# Patient Record
Sex: FEMALE | Race: WHITE | NOT HISPANIC OR LATINO | Employment: UNEMPLOYED | URBAN - METROPOLITAN AREA
[De-identification: names, ages, dates, MRNs, and addresses within clinical notes are randomized per-mention and may not be internally consistent; named-entity substitution may affect disease eponyms.]

---

## 2013-02-13 LAB — PAP-ABSTRACT: NORMAL

## 2014-02-26 LAB
C TRACH DNA SPEC QL PROBE+SIG AMP: NEGATIVE
N GONORRHOEA DNA SPEC QL PROBE+SIG AMP: NEGATIVE
PAP-ABSTRACT: NORMAL
SPECIMEN DESCRIPTION: NORMAL

## 2017-01-03 ENCOUNTER — OFFICE VISIT (OUTPATIENT)
Dept: OBGYN | Facility: CLINIC | Age: 62
End: 2017-01-03
Payer: COMMERCIAL

## 2017-01-03 ENCOUNTER — RADIANT APPOINTMENT (OUTPATIENT)
Dept: MAMMOGRAPHY | Facility: CLINIC | Age: 62
End: 2017-01-03
Payer: COMMERCIAL

## 2017-01-03 VITALS
HEIGHT: 63 IN | DIASTOLIC BLOOD PRESSURE: 84 MMHG | BODY MASS INDEX: 28.7 KG/M2 | WEIGHT: 162 LBS | SYSTOLIC BLOOD PRESSURE: 156 MMHG

## 2017-01-03 DIAGNOSIS — R30.0 DYSURIA: ICD-10-CM

## 2017-01-03 DIAGNOSIS — Z11.59 NEED FOR HEPATITIS C SCREENING TEST: ICD-10-CM

## 2017-01-03 DIAGNOSIS — Z01.419 ENCOUNTER FOR GYNECOLOGICAL EXAMINATION WITHOUT ABNORMAL FINDING: Primary | ICD-10-CM

## 2017-01-03 DIAGNOSIS — Z12.31 VISIT FOR SCREENING MAMMOGRAM: ICD-10-CM

## 2017-01-03 PROBLEM — C34.32 MALIGNANT NEOPLASM OF LOWER LOBE OF LEFT LUNG (H): Status: ACTIVE | Noted: 2017-01-03

## 2017-01-03 LAB
ALBUMIN UR-MCNC: NEGATIVE MG/DL
APPEARANCE UR: CLEAR
BILIRUB UR QL STRIP: NEGATIVE
COLOR UR AUTO: YELLOW
GLUCOSE UR STRIP-MCNC: NEGATIVE MG/DL
HGB UR QL STRIP: NEGATIVE
KETONES UR STRIP-MCNC: NEGATIVE MG/DL
LEUKOCYTE ESTERASE UR QL STRIP: NEGATIVE
NITRATE UR QL: NEGATIVE
NON-SQ EPI CELLS #/AREA URNS LPF: ABNORMAL /LPF
PH UR STRIP: 7.5 PH (ref 5–7)
RBC #/AREA URNS AUTO: ABNORMAL /HPF (ref 0–2)
SP GR UR STRIP: 1.01 (ref 1–1.03)
URN SPEC COLLECT METH UR: ABNORMAL
UROBILINOGEN UR STRIP-ACNC: 0.2 EU/DL (ref 0.2–1)
WBC #/AREA URNS AUTO: ABNORMAL /HPF (ref 0–2)

## 2017-01-03 PROCEDURE — G0202 SCR MAMMO BI INCL CAD: HCPCS | Mod: TC

## 2017-01-03 PROCEDURE — 36415 COLL VENOUS BLD VENIPUNCTURE: CPT | Performed by: OBSTETRICS & GYNECOLOGY

## 2017-01-03 PROCEDURE — G0145 SCR C/V CYTO,THINLAYER,RESCR: HCPCS | Mod: 90 | Performed by: OBSTETRICS & GYNECOLOGY

## 2017-01-03 PROCEDURE — 99000 SPECIMEN HANDLING OFFICE-LAB: CPT | Performed by: OBSTETRICS & GYNECOLOGY

## 2017-01-03 PROCEDURE — 86803 HEPATITIS C AB TEST: CPT | Mod: 90 | Performed by: OBSTETRICS & GYNECOLOGY

## 2017-01-03 PROCEDURE — 81001 URINALYSIS AUTO W/SCOPE: CPT | Performed by: OBSTETRICS & GYNECOLOGY

## 2017-01-03 PROCEDURE — 99396 PREV VISIT EST AGE 40-64: CPT | Performed by: OBSTETRICS & GYNECOLOGY

## 2017-01-03 RX ORDER — CLONAZEPAM 1 MG/1
1 TABLET ORAL
COMMUNITY
Start: 2016-12-07 | End: 2018-03-12

## 2017-01-03 RX ORDER — GABAPENTIN 300 MG/1
300 CAPSULE ORAL
COMMUNITY
Start: 2016-07-20 | End: 2018-03-12

## 2017-01-03 RX ORDER — TRIAMTERENE/HYDROCHLOROTHIAZID 37.5-25 MG
0.5 TABLET ORAL
COMMUNITY
Start: 2016-06-14

## 2017-01-03 RX ORDER — ALBUTEROL SULFATE 90 UG/1
2 AEROSOL, METERED RESPIRATORY (INHALATION)
COMMUNITY
Start: 2016-10-17

## 2017-01-03 RX ORDER — AMLODIPINE BESYLATE 5 MG/1
5 TABLET ORAL
COMMUNITY
Start: 2016-12-22 | End: 2017-01-03

## 2017-01-03 RX ORDER — ALBUTEROL SULFATE 0.83 MG/ML
2.5 SOLUTION RESPIRATORY (INHALATION)
COMMUNITY
Start: 2016-06-14

## 2017-01-03 RX ORDER — AMLODIPINE BESYLATE 5 MG/1
TABLET ORAL
Refills: 0 | COMMUNITY
Start: 2016-12-22 | End: 2017-01-03

## 2017-01-03 RX ORDER — TIOTROPIUM BROMIDE 18 UG/1
CAPSULE ORAL; RESPIRATORY (INHALATION)
COMMUNITY
Start: 2016-08-19 | End: 2019-07-01

## 2017-01-03 RX ORDER — HYDROCODONE BITARTRATE AND ACETAMINOPHEN 10; 325 MG/1; MG/1
1 TABLET ORAL EVERY 6 HOURS PRN
COMMUNITY

## 2017-01-03 RX ORDER — GLUCOSAMINE SULFATE DIPOT CHLR 1000 MG
TABLET ORAL
COMMUNITY
Start: 2016-11-17 | End: 2017-01-03

## 2017-01-03 RX ORDER — ATENOLOL 25 MG/1
12.5 TABLET ORAL
COMMUNITY
Start: 2016-10-12

## 2017-01-03 RX ORDER — PRAVASTATIN SODIUM 40 MG
TABLET ORAL
COMMUNITY
Start: 2016-09-06

## 2017-01-03 ASSESSMENT — ANXIETY QUESTIONNAIRES
1. FEELING NERVOUS, ANXIOUS, OR ON EDGE: SEVERAL DAYS
5. BEING SO RESTLESS THAT IT IS HARD TO SIT STILL: NOT AT ALL
6. BECOMING EASILY ANNOYED OR IRRITABLE: NEARLY EVERY DAY
3. WORRYING TOO MUCH ABOUT DIFFERENT THINGS: SEVERAL DAYS
7. FEELING AFRAID AS IF SOMETHING AWFUL MIGHT HAPPEN: SEVERAL DAYS
2. NOT BEING ABLE TO STOP OR CONTROL WORRYING: SEVERAL DAYS
IF YOU CHECKED OFF ANY PROBLEMS ON THIS QUESTIONNAIRE, HOW DIFFICULT HAVE THESE PROBLEMS MADE IT FOR YOU TO DO YOUR WORK, TAKE CARE OF THINGS AT HOME, OR GET ALONG WITH OTHER PEOPLE: SOMEWHAT DIFFICULT
GAD7 TOTAL SCORE: 7

## 2017-01-03 ASSESSMENT — PATIENT HEALTH QUESTIONNAIRE - PHQ9: 5. POOR APPETITE OR OVEREATING: NOT AT ALL

## 2017-01-03 NOTE — Clinical Note
Please abstract the following data from this visit with this patient into the appropriate field in Epic:  Colonoscopy done on this date: 2013 (approximately), by this group:  [facility], results were Normal.

## 2017-01-03 NOTE — Clinical Note
[facility] [place]  [address]  [address]  [place] [address]  [phone]      1/4/2017     Cheryle C Warnberg  [address]  [place] [address]        Cheryle C Warnberg your lab results came back normal.        Results for orders placed or performed in visit on 01/03/17   Hepatitis C antibody   Result Value Ref Range    Hepatitis C Antibody  NR     Nonreactive   Assay performance characteristics have not been established for newborns,   infants, and children         Your hepatitis C test was negative. Have a great year!    Cordially    GETACHEW Saavedra CNP  [facility] [place]

## 2017-01-03 NOTE — PROGRESS NOTES
Cheryle is a 61 year old No obstetric history on file. female who presents for annual exam.     Besides routine health maintenance, she has no other health concerns today .    HPI: The patient was seen for annual examination. She has had a very challenging year as she was found to have a lung cancer in the lower left lobe of the lung. She had a laparoscopic partial resection of lung. She did not need any radiation therapy or chemotherapy. She has a 2 day history of some sharp low back pain. She is not having dysuria. Urinalysis will be ordered.  The patient's PCP is  Sanjay Grissom MD      GYNECOLOGIC HISTORY:    No LMP recorded. Patient has had a hysterectomy.  Her current contraception method is: hysterectomy.  She is no longer smoking due to lung cancer and surgery    Patient is sexually active.  STD testing offered?  Declined  Last PHQ-9 score on record =   PHQ-9 SCORE 1/3/2017   Total Score 9     Last GAD7 score on record =   AMANDA-7 SCORE 1/3/2017   Total Score 7     Alcohol Score = 0    HEALTH MAINTENANCE:  Cholesterol: Followed by PCP  Last Mammo: 6/2015, Result: normal, Next Mammo: today  Pap: (PAP      NIL   6/30/2015 )  Colonoscopy:  2013, Result: normal, Next Colonoscopy: 2023  Dexa:  6/30/2015    Health maintenance updated:  yes    HISTORY:  Obstetric History     No data available          Patient Active Problem List   Diagnosis     Essential hypertension, benign     Past Surgical History   Procedure Laterality Date     Hysterectomy  1999     C lat lumbar spine fusion        Social History   Substance Use Topics     Smoking status: Current Every Day Smoker     Smokeless tobacco: Never Used     Alcohol Use: No      Problem (# of Occurrences) Relation (Name,Age of Onset)    Hypertension (1) Mother            Current Outpatient Prescriptions   Medication Sig     atenolol (TENORMIN) 25 MG tablet Take 12.5 mg by mouth     clonazePAM (KLONOPIN) 1 MG tablet Take 1 mg by mouth     albuterol (2.5 MG/3ML) 0.083% neb  "solution Inhale 2.5 mg into the lungs     gabapentin (NEURONTIN) 300 MG capsule Take 300 mg by mouth     pravastatin (PRAVACHOL) 40 MG tablet      beclomethasone (QVAR) 80 MCG/ACT Inhaler      tiotropium (SPIRIVA HANDIHALER) 18 MCG capsule      tiZANidine (ZANAFLEX) 4 MG tablet      triamterene-hydrochlorothiazide (MAXZIDE-25) 37.5-25 MG per tablet Take 0.5 tablets by mouth     albuterol (VENTOLIN HFA) 108 (90 BASE) MCG/ACT Inhaler Inhale 2 puffs into the lungs     HYDROcodone-acetaminophen (NORCO)  MG per tablet Take 1 tablet by mouth every 6 hours as needed for moderate to severe pain     estradiol (ESTRACE) 1 MG tablet Take one half tablet by mouth once daily     acyclovir (ZOVIRAX) 400 MG tablet Take 1 tablet (400 mg) by mouth daily     No current facility-administered medications for this visit.     Allergies   Allergen Reactions     Aspirin Nausea     \"hot ears\"     Atorvastatin      Other reaction(s): Myalgia     Codeine Nausea and Vomiting     Ipratropium-Albuterol Cough     Morphine      Other reaction(s): Vomiting     Oxycodone-Acetaminophen Itching       Past medical, surgical, social and family histories were reviewed and updated in EPIC.    ROS:   12 point review of systems negative other than symptoms noted below.  Gastrointestinal: Constipation  Skin: Skin Dryness  Neurologic: Tremors    EXAM:  /110 mmHg  Ht 5' 2.75\" (1.594 m)  Wt 162 lb (73.483 kg)  BMI 28.92 kg/m2  Breastfeeding? No   BMI: Body mass index is 28.92 kg/(m^2).    PHYSICAL EXAM:  Constitutional:  Appearance: Well nourished, well developed, alert, in no acute distress  Neck:  Lymph Nodes:  No lymphadenopathy present    Thyroid:  Gland size normal, nontender, no nodules or masses present  on palpation  Chest:  Respiratory Effort:  Breathing unlabored  Cardiovascular:    Heart: Auscultation:  Regular rate, normal rhythm, no murmurs present  Breasts: Inspection of Breasts:  No lymphadenopathy present    Palpation of Breasts " and Axillae:  No masses present on palpation, no  breast tenderness    Axillary Lymph Nodes:  No lymphadenopathy present  Gastrointestinal:   Abdominal Examination:  Abdomen nontender to palpation, tone normal without rigidity or guarding, no masses present, umbilicus without lesions   Liver and Spleen:  No hepatomegaly present, liver nontender to palpation    Hernias:  No hernias present  Lymphatic: Lymph Nodes:  No other lymphadenopathy present  Skin:  General Inspection:  No rashes present, no lesions present, no areas of  discoloration    Genitalia and Groin:  No rashes present, no lesions present, no areas of  discoloration, no masses present  Neurologic/Psychiatric:    Mental Status:  Oriented X3     Pelvic Exam:  External Genitalia:     Normal appearance for age, no discharge present, no tenderness present, no inflammatory lesions present, color normal  Vagina:     Normal vaginal vault without central or paravaginal defects, no discharge present, no inflammatory lesions present, no masses present  Bladder:     Nontender to palpation  Urethra:   Urethral Body:  Urethra palpation normal, urethra structural support normal   Urethral Meatus:  No erythema or lesions present  Cervix:     Surgically absent  Uterus:     Surgically absent  Adnexa:     Surgically absent  Perineum:     Perineum within normal limits, no evidence of trauma, no rashes or skin lesions present  Anus:     Anus within normal limits, no hemorrhoids present  Inguinal Lymph Nodes:     No lymphadenopathy present    COUNSELING:   Reviewed preventive health counseling, as reflected in patient instructions       Regular exercise       Healthy diet/nutrition    BMI: Body mass index is 28.92 kg/(m^2).  Weight management plan: Discussed healthy diet and exercise guidelines and patient will follow up in 6 months in clinic to re-evaluate.    ASSESSMENT:  61 year old female with satisfactory annual exam.    ICD-10-CM    1. Encounter for gynecological  examination without abnormal finding [Z01.419] Z01.419 Pap imaged thin layer screen reflex to HPV if ASCUS - recommended age 25 - 29 years   2. Need for hepatitis C screening test Z11.59 Hepatitis C antibody   3. Lower back pain M54.5 UA with Microscopic       PLAN:  Patient has an adequate examination. She has good support from her previous vaginal surgery. Bladder support is excellent. Her back pain is really midline and low lumbar. We will do a urinalysis and get her mammogram today. Both results of her Pap smear and mammogram will be relayed to her. She does have some mild vulvitis that appears to be irritated from pads. We have asked her to use some a and D ointment.    Ricky Tom MD

## 2017-01-03 NOTE — Clinical Note
[facility]  [address] [address]  [place] [address]  [phone]      January 5, 2017    Cheryle C Warnberg  [address] [place] [id number]    Dear Cheryle,  We are happy to inform you that your PAP smear result is normal.  We are now able to do a follow up test on PAP smears. The DNA test is for HPV (Human Papilloma Virus). Cervical cancer is closely linked with certain types of HPV. Your result showed no evidence of HPV.  Therefore we recommend you return in 1 year for your next pap smear.  You will still need to return to the clinic every year for an annual exam and other preventive tests.  Please contact the clinic with any questions.  Sincerely,  Ricky Tom MD

## 2017-01-04 LAB — HCV AB SERPL QL IA: NORMAL

## 2017-01-04 ASSESSMENT — PATIENT HEALTH QUESTIONNAIRE - PHQ9: SUM OF ALL RESPONSES TO PHQ QUESTIONS 1-9: 9

## 2017-01-04 ASSESSMENT — ANXIETY QUESTIONNAIRES: GAD7 TOTAL SCORE: 7

## 2017-01-05 LAB
COPATH REPORT: NORMAL
PAP: NORMAL

## 2017-01-19 DIAGNOSIS — B00.9 HSV (HERPES SIMPLEX VIRUS) INFECTION: ICD-10-CM

## 2017-01-19 DIAGNOSIS — N95.1 SYMPTOMATIC MENOPAUSAL OR FEMALE CLIMACTERIC STATES: Primary | ICD-10-CM

## 2017-01-19 RX ORDER — ACYCLOVIR 400 MG/1
400 TABLET ORAL DAILY
Qty: 90 TABLET | Refills: 3 | Status: SHIPPED | OUTPATIENT
Start: 2017-01-19 | End: 2017-12-09

## 2017-01-19 RX ORDER — ESTRADIOL 1 MG/1
TABLET ORAL
Qty: 45 TABLET | Refills: 3 | Status: SHIPPED | OUTPATIENT
Start: 2017-01-19 | End: 2018-01-28

## 2017-02-10 ENCOUNTER — TELEPHONE (OUTPATIENT)
Dept: OBGYN | Facility: CLINIC | Age: 62
End: 2017-02-10

## 2017-02-10 NOTE — TELEPHONE ENCOUNTER
Was seen a couple weeks ago with Dr. Tom, had a sore bottom and was told to use A&D ointment. Calling today as it has gotten much worse.  Please call.

## 2017-02-10 NOTE — TELEPHONE ENCOUNTER
1/3/17 Annual Pt has severe vulvitis and wanted to know if there is anything that she could use other than A and D. Reviewed with Dr. Tom. Pt needs to be seen. Pt informed to try warm soaks and A and D and be seen.   1/3/17 She does have some mild vulvitis that appears to be irritated from pads. We have asked her to use some a and D ointment.

## 2017-02-13 ENCOUNTER — OFFICE VISIT (OUTPATIENT)
Dept: OBGYN | Facility: CLINIC | Age: 62
End: 2017-02-13
Payer: COMMERCIAL

## 2017-02-13 VITALS
SYSTOLIC BLOOD PRESSURE: 130 MMHG | HEIGHT: 63 IN | WEIGHT: 166 LBS | BODY MASS INDEX: 29.41 KG/M2 | DIASTOLIC BLOOD PRESSURE: 76 MMHG

## 2017-02-13 DIAGNOSIS — N76.3 CHRONIC VULVITIS: Primary | ICD-10-CM

## 2017-02-13 PROCEDURE — 99213 OFFICE O/P EST LOW 20 MIN: CPT | Performed by: OBSTETRICS & GYNECOLOGY

## 2017-02-13 NOTE — MR AVS SNAPSHOT
"              After Visit Summary   2017    Cheryle C Warnberg    MRN: [id number]           Patient Information     Date Of Birth          1955        Visit Information        Provider Department      2017 1:15 PM Ricky Tom MD [facility]        Today's Diagnoses     Chronic vulvitis    -  1       Follow-ups after your visit        Who to contact     If you have questions or need follow up information about today's clinic visit or your schedule please contact [facility] directly at [phone].  Normal or non-critical lab and imaging results will be communicated to you by [employer], letter or phone within 4 business days after the clinic has received the results. If you do not hear from us within 7 days, please contact the clinic through [employer] or phone. If you have a critical or abnormal lab result, we will notify you by phone as soon as possible.  Submit refill requests through [employer] or call your pharmacy and they will forward the refill request to us. Please allow 3 business days for your refill to be completed.          Additional Information About Your Visit        MyChart Information     [employer] lets you send messages to your doctor, view your test results, renew your prescriptions, schedule appointments and more. To sign up, go to [email or web address] . Click on \"Log in\" on the left side of the screen, which will take you to the Welcome page. Then click on \"Sign up Now\" on the right side of the page.     You will be asked to enter the access code listed below, as well as some personal information. Please follow the directions to create your username and password.     Your access code is: [email or web address]  Expires: 4/3/2017  2:14 PM     Your access code will  in 90 days. If you need help or a new code, please call your [facility] clinic or [phone].        Care EveryWhere ID     This is your Care EveryWhere ID. This could be used by other " "organizations to access your [facility] medical records  [id number]        Your Vitals Were     Height BMI (Body Mass Index)                1.594 m (5' 2.75\") 29.64 kg/m2           Blood Pressure from Last 3 Encounters:   02/13/17 130/76   01/03/17 156/84   06/30/15 124/76    Weight from Last 3 Encounters:   02/13/17 75.3 kg (166 lb)   01/03/17 73.5 kg (162 lb)   06/30/15 78.5 kg (173 lb)              Today, you had the following     No orders found for display       Primary Care Provider Office Phone # Fax #    Sanjay Grissom [phone]       [facility] [place] [address] [place] [address]        Thank you!     Thank you for choosing [facility]  for your care. Our goal is always to provide you with excellent care. Hearing back from our patients is one way we can continue to improve our services. Please take a few minutes to complete the written survey that you may receive in the mail after your visit with us. Thank you!             Your Updated Medication List - Protect others around you: Learn how to safely use, store and throw away your medicines at www.disposemymeds.org.          This list is accurate as of: 2/13/17  1:39 PM.  Always use your most recent med list.                   Brand Name Dispense Instructions for use    acyclovir 400 MG tablet    ZOVIRAX    90 tablet    Take 1 tablet (400 mg) by mouth daily       * albuterol (2.5 MG/3ML) 0.083% neb solution      Inhale 2.5 mg into the lungs       * VENTOLIN  (90 BASE) MCG/ACT Inhaler   Generic drug:  albuterol      Inhale 2 puffs into the lungs       atenolol 25 MG tablet    TENORMIN     Take 12.5 mg by mouth       clonazePAM 1 MG tablet    klonoPIN     Take 1 mg by mouth       estradiol 1 MG tablet    ESTRACE    45 tablet    Take one half tablet by mouth once daily       gabapentin 300 MG capsule    NEURONTIN     Take 300 mg by mouth       HYDROcodone-acetaminophen  MG per tablet    NORCO     Take 1 " tablet by mouth every 6 hours as needed for moderate to severe pain       pravastatin 40 MG tablet    PRAVACHOL         QVAR 80 MCG/ACT Inhaler   Generic drug:  beclomethasone          SPIRIVA HANDIHALER 18 MCG capsule   Generic drug:  tiotropium          tiZANidine 4 MG tablet    ZANAFLEX         triamterene-hydrochlorothiazide 37.5-25 MG per tablet    MAXZIDE-25     Take 0.5 tablets by mouth       * Notice:  This list has 2 medication(s) that are the same as other medications prescribed for you. Read the directions carefully, and ask your doctor or other care provider to review them with you.

## 2017-02-13 NOTE — PROGRESS NOTES
SUBJECTIVE:                                                   Cheryle C Warnberg is a 62 year old female who presents to clinic today for the following health issue(s):  No chief complaint on file.      Additional information:     HPI: The patient is seen at this time for follow-up of vulvitis. She tried a and D Ointment but it was too irritating. She has been sitting in the tub and soaking and there is some improvement.    No LMP recorded. Patient has had a hysterectomy..   Patient is not sexually active, No obstetric history on file..  Using hysterectomy for contraception.    reports that she has been smoking.  She has never used smokeless tobacco.    STD testing offered?  Declined    Health maintenance updated:  yes    Today's PHQ-2 Score:   PHQ-2 ( 1999 [employer]) 6/30/2015   Q1: Little interest or pleasure in doing things 0   Q2: Feeling down, depressed or hopeless 0   PHQ-2 Score 0     Today's PHQ-9 Score:   PHQ-9 SCORE 1/3/2017   Total Score 9     Today's AMANDA-7 Score:   AMANDA-7 SCORE 1/3/2017   Total Score 7       Problem list and histories reviewed & adjusted, as indicated.  Additional history: as documented.    Patient Active Problem List   Diagnosis     Essential hypertension, benign     Malignant neoplasm of lower lobe of left lung (H)     Past Surgical History   Procedure Laterality Date     Hysterectomy  1999     C lat lumbar spine fusion        Social History   Substance Use Topics     Smoking status: Current Every Day Smoker     Smokeless tobacco: Never Used     Alcohol use No      Problem (# of Occurrences) Relation (Name,Age of Onset)    Hypertension (1) Mother            Current Outpatient Prescriptions   Medication Sig     estradiol (ESTRACE) 1 MG tablet Take one half tablet by mouth once daily     acyclovir (ZOVIRAX) 400 MG tablet Take 1 tablet (400 mg) by mouth daily     atenolol (TENORMIN) 25 MG tablet Take 12.5 mg by mouth     clonazePAM (KLONOPIN) 1 MG tablet Take 1 mg by mouth     albuterol  "(2.5 MG/3ML) 0.083% neb solution Inhale 2.5 mg into the lungs     gabapentin (NEURONTIN) 300 MG capsule Take 300 mg by mouth     pravastatin (PRAVACHOL) 40 MG tablet      beclomethasone (QVAR) 80 MCG/ACT Inhaler      tiotropium (SPIRIVA HANDIHALER) 18 MCG capsule      tiZANidine (ZANAFLEX) 4 MG tablet      triamterene-hydrochlorothiazide (MAXZIDE-25) 37.5-25 MG per tablet Take 0.5 tablets by mouth     albuterol (VENTOLIN HFA) 108 (90 BASE) MCG/ACT Inhaler Inhale 2 puffs into the lungs     HYDROcodone-acetaminophen (NORCO)  MG per tablet Take 1 tablet by mouth every 6 hours as needed for moderate to severe pain     No current facility-administered medications for this visit.      Allergies   Allergen Reactions     Aspirin Nausea     \"hot ears\"     Atorvastatin      Other reaction(s): Myalgia     Codeine Nausea and Vomiting     Ipratropium-Albuterol Cough     Morphine      Other reaction(s): Vomiting     Oxycodone-Acetaminophen Itching       ROS:  12 point review of systems negative other than symptoms noted below.  Cardiovascular: Palpitations  Gastrointestinal: Abdominal Pain, Constipation and Diarrhea  Genitourinary: Cramps, Painful Urination, Pelvic Pain, Vaginal Discharge, Vaginal Dryness and Vaginal Itching  Skin: Skin Dryness  Neurologic: Tremors  Musculoskeletal: Muscle Cramps  Endocrine: Excessive Thirst  Psychiatric: Ferguson    OBJECTIVE:     There were no vitals taken for this visit.  There is no height or weight on file to calculate BMI.    Exam:  Constitutional:  Appearance: Well nourished, well developed alert, in no acute distress   Pelvic Exam performed showed much improved labial and vulvar skin. There are no raised or excoriated lesions. There is still some atrophy.    In-Clinic Test Results:  No results found for this or any previous visit (from the past 24 hour(s)).    ASSESSMENT/PLAN:                                                      The patient is seen at this time for follow-up of " vulvitis. She is asked to try the zinc oxide variety of AMD as she did not tolerate the standard version. She will continue to soak in a tub. She also complained of some abdominal burning but she has no internal GYN organs and abdominal examination was unremarkable.          Ricky Tom MD  [facility]

## 2017-02-24 ENCOUNTER — TELEPHONE (OUTPATIENT)
Dept: OBGYN | Facility: CLINIC | Age: 62
End: 2017-02-24

## 2017-02-24 NOTE — TELEPHONE ENCOUNTER
"POC note 2/13/17: \" The patient is seen at this time for follow-up of vulvitis. She tried a and D Ointment but it was too irritating. She has been sitting in the tub and soaking and there is some improvement.The patient is seen at this time for follow-up of vulvitis. She is asked to try the zinc oxide variety of AMD as she did not tolerate the standard version. She will continue to soak in a tub. She also complained of some abdominal burning but she has no internal GYN organs and abdominal examination was unremarkable.\"  Informed pt of her mammogram results. Pt stated that a few days ago she noticed discharge from both of her nipples, she states it is dark in color almost black and contains pus. No odor, no redness, no warmth to the touch, no fevers.   Pt states her vulvitis is not getting better and she states after she was seen the last time her symptoms have gotten worse. Pt states frustration since she has been to the clinic twice and her symptoms have not improved.   Dr. Tom out of office- note routed to Maral Eid to review and advise.   "

## 2017-02-27 ENCOUNTER — OFFICE VISIT (OUTPATIENT)
Dept: OBGYN | Facility: CLINIC | Age: 62
End: 2017-02-27
Payer: COMMERCIAL

## 2017-02-27 ENCOUNTER — TELEPHONE (OUTPATIENT)
Dept: NURSING | Facility: CLINIC | Age: 62
End: 2017-02-27

## 2017-02-27 VITALS — SYSTOLIC BLOOD PRESSURE: 138 MMHG | DIASTOLIC BLOOD PRESSURE: 80 MMHG | BODY MASS INDEX: 29.64 KG/M2 | WEIGHT: 166 LBS

## 2017-02-27 DIAGNOSIS — N76.2 ACUTE VULVITIS: ICD-10-CM

## 2017-02-27 DIAGNOSIS — N64.52 NIPPLE DISCHARGE: Primary | ICD-10-CM

## 2017-02-27 PROCEDURE — 99213 OFFICE O/P EST LOW 20 MIN: CPT | Performed by: NURSE PRACTITIONER

## 2017-02-27 PROCEDURE — 88160 CYTOPATH SMEAR OTHER SOURCE: CPT | Performed by: NURSE PRACTITIONER

## 2017-02-27 RX ORDER — CLOBETASOL PROPIONATE 0.5 MG/G
CREAM TOPICAL DAILY
Qty: 15 G | Refills: 0 | Status: SHIPPED | OUTPATIENT
Start: 2017-02-27 | End: 2019-07-01

## 2017-02-27 NOTE — MR AVS SNAPSHOT
After Visit Summary   2/27/2017    Cheryle C Warnberg    MRN: [id number]           Patient Information     Date Of Birth          1955        Visit Information        Provider Department      2/27/2017 11:00 AM Michelle Eid APRN CNP [facility]        Today's Diagnoses     Nipple discharge    -  1    Acute vulvitis           Follow-ups after your visit        Follow-up notes from your care team     Return in about 1 week (around 3/6/2017) for janelle.      Your next 10 appointments already scheduled     Mar 06, 2017 11:00 AM CST   Office Visit with GETACHEW Le CNP   [facility] ([facility])    [address] [place] [id number] [phone]           Bring a current list of meds and any records pertaining to this visit.  For Physicals, please bring immunization records and any forms needing to be filled out.  Please arrive 10 minutes early to complete paperwork.              Future tests that were ordered for you today     Open Future Orders        Priority Expected Expires Ordered    US Breast Bilateral Complete 4 Quadrants Routine 2/27/2017 2/26/2018 2/27/2017            Who to contact     If you have questions or need follow up information about today's clinic visit or your schedule please contact [facility] directly at [phone].  Normal or non-critical lab and imaging results will be communicated to you by MyChart, letter or phone within 4 business days after the clinic has received the results. If you do not hear from us within 7 days, please contact the clinic through MyChart or phone. If you have a critical or abnormal lab result, we will notify you by phone as soon as possible.  Submit refill requests through Mimeo or call your pharmacy and they will forward the refill request to us. Please allow 3 business days for your refill to be completed.           Additional Information About Your Visit        MyChart Information     Next audience gives you secure access to your electronic health record. If you see a primary care provider, you can also send messages to your care team and make appointments. If you have questions, please call your primary care clinic.  If you do not have a primary care provider, please call [phone] and they will assist you.        Care EveryWhere ID     This is your Care EveryWhere ID. This could be used by other organizations to access your [place] medical records  [id number]        Your Vitals Were     Breastfeeding? BMI (Body Mass Index)                No 29.64 kg/m2           Blood Pressure from Last 3 Encounters:   02/27/17 138/80   02/13/17 130/76   01/03/17 156/84    Weight from Last 3 Encounters:   02/27/17 166 lb (75.3 kg)   02/13/17 166 lb (75.3 kg)   01/03/17 162 lb (73.5 kg)              We Performed the Following     Cytology non gyn          Today's Medication Changes          These changes are accurate as of: 2/27/17 11:46 AM.  If you have any questions, ask your nurse or doctor.               Start taking these medicines.        Dose/Directions    clobetasol 0.05 % cream   Commonly known as:  TEMOVATE   Used for:  Acute vulvitis   Started by:  Michelle Eid APRN CNP        Apply topically daily Apply sparingly to affected area  daily for 14 days.  Do not apply to face carie   Quantity:  15 g   Refills:  0            Where to get your medicines      These medications were sent to [facility] [address] [place] [address] [address] [facility]  [address] [place] [address]     Phone:  [phone]     clobetasol 0.05 % cream                Primary Care Provider Office Phone # Fax #    Sanjay Grissom [phone]       [facility] [address] [address]        Thank you!     Thank you for choosing [facility]  for your care. Our goal is  always to provide you with excellent care. Hearing back from our patients is one way we can continue to improve our services. Please take a few minutes to complete the written survey that you may receive in the mail after your visit with us. Thank you!             Your Updated Medication List - Protect others around you: Learn how to safely use, store and throw away your medicines at www.disposemymeds.org.          This list is accurate as of: 2/27/17 11:46 AM.  Always use your most recent med list.                   Brand Name Dispense Instructions for use    acyclovir 400 MG tablet    ZOVIRAX    90 tablet    Take 1 tablet (400 mg) by mouth daily       * albuterol (2.5 MG/3ML) 0.083% neb solution      Inhale 2.5 mg into the lungs       * VENTOLIN  (90 BASE) MCG/ACT Inhaler   Generic drug:  albuterol      Inhale 2 puffs into the lungs       atenolol 25 MG tablet    TENORMIN     Take 12.5 mg by mouth       clobetasol 0.05 % cream    TEMOVATE    15 g    Apply topically daily Apply sparingly to affected area  daily for 14 days.  Do not apply to face carie       clonazePAM 1 MG tablet    klonoPIN     Take 1 mg by mouth       estradiol 1 MG tablet    ESTRACE    45 tablet    Take one half tablet by mouth once daily       gabapentin 300 MG capsule    NEURONTIN     Take 300 mg by mouth       HYDROcodone-acetaminophen  MG per tablet    NORCO     Take 1 tablet by mouth every 6 hours as needed for moderate to severe pain       pravastatin 40 MG tablet    PRAVACHOL         QVAR 80 MCG/ACT Inhaler   Generic drug:  beclomethasone          SPIRIVA HANDIHALER 18 MCG capsule   Generic drug:  tiotropium          tiZANidine 4 MG tablet    ZANAFLEX         triamterene-hydrochlorothiazide 37.5-25 MG per tablet    MAXZIDE-25     Take 0.5 tablets by mouth       * Notice:  This list has 2 medication(s) that are the same as other medications prescribed for you. Read the directions carefully, and ask your doctor or other care  provider to review them with you.

## 2017-02-27 NOTE — PROGRESS NOTES
SUBJECTIVE:                                                   Cheryle C Warnberg is a 62 year old female who presents to clinic today for the following health issue(s):  Patient presents with:  Vaginal Problem: pain  Breast Discharge      HPI:here for recurrent vaginal irritation and pain .  Been doing tub soaks bid and  A&D ointment with Zinc.  Having to wear nightgown all day with no underwear to tolerate.  Also noted some brown discharge from both nipples since last week.  Denies any pain no lumps felt.  Hx of lung CA      No LMP recorded. Patient has had a hysterectomy..   Patient is not sexually active, No obstetric history on file..  Using hysterectomy for contraception.    reports that she has quit smoking. She has never used smokeless tobacco.    STD testing offered?  Declined    Health maintenance updated:  yes    Today's PHQ-2 Score:   PHQ-2 ( 1999 Pfizer) 6/30/2015   Q1: Little interest or pleasure in doing things 0   Q2: Feeling down, depressed or hopeless 0   PHQ-2 Score 0     Today's PHQ-9 Score:   PHQ-9 SCORE 1/3/2017   Total Score 9     Today's AMANDA-7 Score:   AMANDA-7 SCORE 1/3/2017   Total Score 7       Problem list and histories reviewed & adjusted, as indicated.  Additional history: as documented.    Patient Active Problem List   Diagnosis     Essential hypertension, benign     Malignant neoplasm of lower lobe of left lung (H)     Past Surgical History   Procedure Laterality Date     Hysterectomy  1999     C lat lumbar spine fusion        Social History   Substance Use Topics     Smoking status: Former Smoker     Smokeless tobacco: Never Used      Comment: stopped 4/11/16     Alcohol use No      Problem (# of Occurrences) Relation (Name,Age of Onset)    Hypertension (1) Mother            Current Outpatient Prescriptions   Medication Sig     clobetasol (TEMOVATE) 0.05 % cream Apply topically daily Apply sparingly to affected area  daily for 14 days.  Do not apply to face carie     estradiol  "(ESTRACE) 1 MG tablet Take one half tablet by mouth once daily     acyclovir (ZOVIRAX) 400 MG tablet Take 1 tablet (400 mg) by mouth daily     atenolol (TENORMIN) 25 MG tablet Take 12.5 mg by mouth     clonazePAM (KLONOPIN) 1 MG tablet Take 1 mg by mouth     albuterol (2.5 MG/3ML) 0.083% neb solution Inhale 2.5 mg into the lungs     gabapentin (NEURONTIN) 300 MG capsule Take 300 mg by mouth     pravastatin (PRAVACHOL) 40 MG tablet      beclomethasone (QVAR) 80 MCG/ACT Inhaler      tiotropium (SPIRIVA HANDIHALER) 18 MCG capsule      tiZANidine (ZANAFLEX) 4 MG tablet      triamterene-hydrochlorothiazide (MAXZIDE-25) 37.5-25 MG per tablet Take 0.5 tablets by mouth     albuterol (VENTOLIN HFA) 108 (90 BASE) MCG/ACT Inhaler Inhale 2 puffs into the lungs     HYDROcodone-acetaminophen (NORCO)  MG per tablet Take 1 tablet by mouth every 6 hours as needed for moderate to severe pain     No current facility-administered medications for this visit.      Allergies   Allergen Reactions     Aspirin Nausea     \"hot ears\"     Atorvastatin      Other reaction(s): Myalgia     Codeine Nausea and Vomiting     Ipratropium-Albuterol Cough     Morphine      Other reaction(s): Vomiting     Oxycodone-Acetaminophen Itching       ROS:  12 point review of systems negative other than symptoms noted below.  Breast: Nipple Discharge  Gastrointestinal: Abdominal Pain and Constipation  Genitourinary: Cramps, Painful Urination, Urgency, Vaginal Dryness and Vaginal Itching  Skin: New Skin Lesions    OBJECTIVE:     /80  Wt 166 lb (75.3 kg)  Breastfeeding? No  BMI 29.64 kg/m2  Body mass index is 29.64 kg/(m^2).    Exam:  Breasts no masses noted.  Bilateral brown nipple discharge upon squeezing.  Discharge placed on slide and sent to cytology.  Axilla negative.    External genitalia very red and atrophic patches on both labia.  Also patches noted in side vagina.  Tender to patient upon exam.  Rechecked by Dr. Tom, and possible lichen " sclerosis.  Patient to continue tub soaks bid. Will start temovate cream only once daily.    In-Clinic Test Results:  No results found for this or any previous visit (from the past 24 hour(s)).    ASSESSMENT/PLAN:                                                        ICD-10-CM    1. Nipple discharge N64.52 Cytology non gyn     US Breast Bilateral Complete 4 Quadrants   2. Acute vulvitis N76.2 clobetasol (TEMOVATE) 0.05 % cream       There are no Patient Instructions on file for this visit.    Will do breast US and appropriate follow up for nipple discharge.  Return in 1 week for janelle with DR. Tom for vaginal irritation.    GETACHEW Le [facility]

## 2017-02-27 NOTE — TELEPHONE ENCOUNTER
Michelle at [facility] call with a question regarding the sig on Clobetasol Cream. The sig states Apply topically daily Apply sparingly to affected area  daily for 14 days.  Do not apply to face carie. Unsure what do not apply for face carie means. Routing to Maral Eid. Please advise.

## 2017-03-01 LAB — COPATH REPORT: NORMAL

## 2017-03-02 ENCOUNTER — TELEPHONE (OUTPATIENT)
Dept: OBGYN | Facility: CLINIC | Age: 62
End: 2017-03-02

## 2017-03-02 NOTE — TELEPHONE ENCOUNTER
Pt has not heard from  Breast Center. Offered to give pt phone number to call and schedule. Pt wants us to call and have them call her. Pt also asking about lab testing Maral did on Monday. Will route to Maral Eid to review lab results and advise. Will call Breast center in AM and ask them to call pt.

## 2017-03-02 NOTE — TELEPHONE ENCOUNTER
Patient called because no one has called her in regards to her mammogram, if it was going to be in depth or and different type of ultra sound. She hasn't heard from anyone and is requesting you to call her back.

## 2017-03-03 NOTE — TELEPHONE ENCOUNTER
[facility] Call. Spoke with central scheduling and they have the referral and will call the pt to schedule.

## 2017-03-06 ENCOUNTER — TELEPHONE (OUTPATIENT)
Dept: NURSING | Facility: CLINIC | Age: 62
End: 2017-03-06

## 2017-03-06 NOTE — TELEPHONE ENCOUNTER
Reason for Call:  Other call back    Detailed comments: pt just returned Alma Delia's phone call from today    Phone Number Patient can be reached at: Home number on file [phone] (home)    Best Time: today or tomorrow am    Can we leave a detailed message on this number? YES    Call taken on 3/6/2017 at 3:48 PM by Aaliyah Subramanian

## 2017-03-06 NOTE — TELEPHONE ENCOUNTER
[facility] has locations in [facility] and at [facility] in [place], [place]. Dr. Tom indicated [place] would probably be the closest. Called [facility] (who are within [facility]) and they would be able to do a diagnostic mammogram and bilateral breast ultrasounds but their ultrasounds are booked out a wk and a half. Also since it's at a hospital the cost may be more expensive than at a clinic. Talked to Dr. Tom and he stated whichever one the pt wants. Once found out location will need call and get fax number information (as to where to send the order off to) and the phone number where pt can call to schedule the appt.      LM on VM (no PHI on consent) to call back.

## 2017-03-06 NOTE — TELEPHONE ENCOUNTER
Pt calling was supposed to have a bilateral breast ultrasound and diagnostic mammogram today at [facility] for (brown) nipple discharge of both bresats but [facility] is considered out of her network and would like an order for [facility] to have it done. Lives in [place]. Routing to Dr. Tom, ok to send orders to [facility]?      2/27/17 visit with MARTA Alicia:   Will do breast US and appropriate follow up for nipple discharge. Return in 1 week for janelle with DR. Tom for vaginal irritation.

## 2017-03-07 NOTE — TELEPHONE ENCOUNTER
Pt called back and informed her of the information below. Pt states she would like to go to [facility] location in [place]. States it's ok to LM on her VM ([phone]) with the phone number that she has to call ([phone]).  Order form placed on Dr. Tom's desk to sign and then return to Triage. Will then need to fax order to [facility] in [place]. Did also print out her screening mammogram results from her mammogram that she had done on 1/3/17 so they have a reference. They are in Epic as well and are also to see the images through Care Everywhere.

## 2017-03-08 NOTE — TELEPHONE ENCOUNTER
Bilateral Digital Diagnostic Mammogram and Breast [facility] CMPL 4 Quad Ultrasound order (with note pt having nipple drainage from both breasts) along with result from screening mammogram from 1/3/17 faxed to [facility] in [place] (fax number: [phone]). Pt informed order was faxed and gave her the phone number to call to schedule the appt (s). Pt verbalized understanding of information.      Closing encounter.

## 2017-03-13 ENCOUNTER — TRANSFERRED RECORDS (OUTPATIENT)
Dept: HEALTH INFORMATION MANAGEMENT | Facility: CLINIC | Age: 62
End: 2017-03-13

## 2017-03-21 ENCOUNTER — TELEPHONE (OUTPATIENT)
Dept: OBGYN | Facility: CLINIC | Age: 62
End: 2017-03-21

## 2017-03-21 NOTE — TELEPHONE ENCOUNTER
rec'd PA for clobetasol cream. Spoke with patient to encourage her to call insurance to see what is on formulary. Patient states that she since has gone to her PCP and was treated for a really bad UTI and feels that some of her symptoms have maybe gotten a little better. She also finds out since being seen that her insurance will no longer allow her to come here. Notified if she needs any records to let us know. Gave her phone numbers for the breast center and [facility] as she requested for mammograms. No PA being done at this time as patient does not plan on picking up this medication.

## 2017-12-09 DIAGNOSIS — B00.9 HSV (HERPES SIMPLEX VIRUS) INFECTION: ICD-10-CM

## 2017-12-11 RX ORDER — ACYCLOVIR 400 MG/1
TABLET ORAL
Qty: 30 TABLET | Refills: 0 | Status: SHIPPED | OUTPATIENT
Start: 2017-12-11 | End: 2018-02-23

## 2017-12-11 NOTE — TELEPHONE ENCOUNTER
acyclovir      Last Written Prescription Date:  1/19/17  Last Fill Quantity: 90,   # refills: 3  Last Office Visit: 1/3/17  Future Office visit:   none    Medication is being filled for 1 time refill only due to:  Patient needs to be seen because it has been more than one year since last visit.   Pt due for annual, no appt scheduled. One month extension sent per [facility] protocol.

## 2018-01-28 DIAGNOSIS — N95.1 SYMPTOMATIC MENOPAUSAL OR FEMALE CLIMACTERIC STATES: ICD-10-CM

## 2018-01-29 RX ORDER — ESTRADIOL 1 MG/1
TABLET ORAL
Qty: 15 TABLET | Refills: 0 | Status: SHIPPED | OUTPATIENT
Start: 2018-01-29 | End: 2019-07-01

## 2018-01-29 NOTE — TELEPHONE ENCOUNTER
estradiol (ESTRACE) 1 MG tablet   Last Written Prescription Date:  1/19/17  Last Fill Quantity: 45,   # refills: 3  Last Office Visit with G primary care provider:  1/3/17  Future Office visit: none    Routing refill request to provider for review/approval because:  Pt due or annual. No appointment scheduled. One month extension sent.

## 2018-02-20 ENCOUNTER — TELEPHONE (OUTPATIENT)
Dept: NURSING | Facility: CLINIC | Age: 63
End: 2018-02-20

## 2018-02-20 DIAGNOSIS — B00.9 HSV (HERPES SIMPLEX VIRUS) INFECTION: ICD-10-CM

## 2018-02-20 RX ORDER — ACYCLOVIR 400 MG/1
TABLET ORAL
Qty: 30 TABLET | Refills: 0 | OUTPATIENT
Start: 2018-02-20

## 2018-02-20 NOTE — TELEPHONE ENCOUNTER
valtrex      Last Written Prescription Date:  12/11/17  Last Fill Quantity: 30,   # refills: 0  Last Office Visit: 1/3/17  Future Office visit:   none    Pt due for annual, no appt scheduled. Pt already received one month extension. Rx denied.

## 2018-02-23 RX ORDER — ACYCLOVIR 400 MG/1
TABLET ORAL
Qty: 90 TABLET | Refills: 0 | Status: SHIPPED | OUTPATIENT
Start: 2018-02-23 | End: 2018-06-15

## 2018-02-23 NOTE — TELEPHONE ENCOUNTER
Has annual scheduled now.  Pt due for annual, appt scheduled,3 month supply sent for insurance purposes.

## 2018-03-12 ENCOUNTER — RADIANT APPOINTMENT (OUTPATIENT)
Dept: MAMMOGRAPHY | Facility: CLINIC | Age: 63
End: 2018-03-12
Payer: COMMERCIAL

## 2018-03-12 ENCOUNTER — OFFICE VISIT (OUTPATIENT)
Dept: OBGYN | Facility: CLINIC | Age: 63
End: 2018-03-12
Payer: COMMERCIAL

## 2018-03-12 VITALS
DIASTOLIC BLOOD PRESSURE: 68 MMHG | WEIGHT: 168 LBS | BODY MASS INDEX: 29.77 KG/M2 | SYSTOLIC BLOOD PRESSURE: 114 MMHG | HEART RATE: 68 BPM | HEIGHT: 63 IN

## 2018-03-12 DIAGNOSIS — N95.1 SYMPTOMATIC MENOPAUSAL OR FEMALE CLIMACTERIC STATES: ICD-10-CM

## 2018-03-12 DIAGNOSIS — N95.1 SYMPTOMS, SUCH AS FLUSHING, SLEEPLESSNESS, HEADACHE, LACK OF CONCENTRATION, ASSOCIATED WITH THE MENOPAUSE: ICD-10-CM

## 2018-03-12 DIAGNOSIS — R30.0 DYSURIA: Primary | ICD-10-CM

## 2018-03-12 DIAGNOSIS — Z12.4 SCREENING FOR CERVICAL CANCER: ICD-10-CM

## 2018-03-12 DIAGNOSIS — Z12.31 VISIT FOR SCREENING MAMMOGRAM: ICD-10-CM

## 2018-03-12 LAB
ALBUMIN UR-MCNC: NEGATIVE MG/DL
APPEARANCE UR: CLEAR
BILIRUB UR QL STRIP: NEGATIVE
COLOR UR AUTO: YELLOW
GLUCOSE UR STRIP-MCNC: NEGATIVE MG/DL
HGB UR QL STRIP: NEGATIVE
KETONES UR STRIP-MCNC: NEGATIVE MG/DL
LEUKOCYTE ESTERASE UR QL STRIP: NEGATIVE
NITRATE UR QL: NEGATIVE
PH UR STRIP: 8.5 PH (ref 5–7)
SOURCE: ABNORMAL
SP GR UR STRIP: 1.01 (ref 1–1.03)
UROBILINOGEN UR STRIP-ACNC: 0.2 EU/DL (ref 0.2–1)

## 2018-03-12 PROCEDURE — 81003 URINALYSIS AUTO W/O SCOPE: CPT | Performed by: OBSTETRICS & GYNECOLOGY

## 2018-03-12 PROCEDURE — 99213 OFFICE O/P EST LOW 20 MIN: CPT | Mod: 25 | Performed by: OBSTETRICS & GYNECOLOGY

## 2018-03-12 PROCEDURE — 99397 PER PM REEVAL EST PAT 65+ YR: CPT | Performed by: OBSTETRICS & GYNECOLOGY

## 2018-03-12 PROCEDURE — 77067 SCR MAMMO BI INCL CAD: CPT | Mod: TC

## 2018-03-12 PROCEDURE — G0476 HPV COMBO ASSAY CA SCREEN: HCPCS | Performed by: OBSTETRICS & GYNECOLOGY

## 2018-03-12 PROCEDURE — G0145 SCR C/V CYTO,THINLAYER,RESCR: HCPCS | Performed by: OBSTETRICS & GYNECOLOGY

## 2018-03-12 RX ORDER — ESTRADIOL 1 MG/1
0.5 TABLET ORAL DAILY
Qty: 45 TABLET | Refills: 3 | Status: CANCELLED | OUTPATIENT
Start: 2018-03-12

## 2018-03-12 RX ORDER — ESTRADIOL 0.5 MG/1
0.5 TABLET ORAL DAILY
Qty: 90 TABLET | Refills: 3 | Status: SHIPPED | OUTPATIENT
Start: 2018-03-12 | End: 2019-02-28

## 2018-03-12 ASSESSMENT — ANXIETY QUESTIONNAIRES
6. BECOMING EASILY ANNOYED OR IRRITABLE: SEVERAL DAYS
5. BEING SO RESTLESS THAT IT IS HARD TO SIT STILL: NOT AT ALL
7. FEELING AFRAID AS IF SOMETHING AWFUL MIGHT HAPPEN: SEVERAL DAYS
2. NOT BEING ABLE TO STOP OR CONTROL WORRYING: SEVERAL DAYS
3. WORRYING TOO MUCH ABOUT DIFFERENT THINGS: SEVERAL DAYS
IF YOU CHECKED OFF ANY PROBLEMS ON THIS QUESTIONNAIRE, HOW DIFFICULT HAVE THESE PROBLEMS MADE IT FOR YOU TO DO YOUR WORK, TAKE CARE OF THINGS AT HOME, OR GET ALONG WITH OTHER PEOPLE: SOMEWHAT DIFFICULT
GAD7 TOTAL SCORE: 6
1. FEELING NERVOUS, ANXIOUS, OR ON EDGE: SEVERAL DAYS

## 2018-03-12 ASSESSMENT — PATIENT HEALTH QUESTIONNAIRE - PHQ9: 5. POOR APPETITE OR OVEREATING: SEVERAL DAYS

## 2018-03-12 NOTE — MR AVS SNAPSHOT
After Visit Summary   3/12/2018    Cheryle C Warnberg    MRN: [id number]           Patient Information     Date Of Birth          1955        Visit Information        Provider Department      3/12/2018 11:30 AM Ricky Tom MD [facility]        Today's Diagnoses     Dysuria    -  1    Screening for cervical cancer        Symptomatic menopausal or female climacteric states        Symptoms, such as flushing, sleeplessness, headache, lack of concentration, associated with the menopause           Follow-ups after your visit        Who to contact     If you have questions or need follow up information about today's clinic visit or your schedule please contact [facility] directly at [phone].  Normal or non-critical lab and imaging results will be communicated to you by MyChart, letter or phone within 4 business days after the clinic has received the results. If you do not hear from us within 7 days, please contact the clinic through Acuspherehart or phone. If you have a critical or abnormal lab result, we will notify you by phone as soon as possible.  Submit refill requests through CTSpace or call your pharmacy and they will forward the refill request to us. Please allow 3 business days for your refill to be completed.          Additional Information About Your Visit        MyChart Information     CTSpace gives you secure access to your electronic health record. If you see a primary care provider, you can also send messages to your care team and make appointments. If you have questions, please call your primary care clinic.  If you do not have a primary care provider, please call [phone] and they will assist you.        Care EveryWhere ID     This is your Care EveryWhere ID. This could be used by other organizations to access your [place] medical records  [id number]        Your Vitals Were     Pulse Height BMI (Body Mass Index)             68 5'  "3.25\" (1.607 m) 29.53 kg/m2          Blood Pressure from Last 3 Encounters:   03/12/18 114/68   02/27/17 138/80   02/13/17 130/76    Weight from Last 3 Encounters:   03/12/18 168 lb (76.2 kg)   02/27/17 166 lb (75.3 kg)   02/13/17 166 lb (75.3 kg)              We Performed the Following     HPV High Risk Types DNA Cervical     Pap imaged thin layer screen with HPV - recommended age 30 - 65     UA without Microscopic          Today's Medication Changes          These changes are accurate as of 3/12/18 11:56 AM.  If you have any questions, ask your nurse or doctor.               These medicines have changed or have updated prescriptions.        Dose/Directions    * estradiol 1 MG tablet   Commonly known as:  ESTRACE   This may have changed:  Another medication with the same name was added. Make sure you understand how and when to take each.   Used for:  Symptomatic menopausal or female climacteric states   Changed by:  Ricky Tom MD        TAKE 1/2 TABLET BY MOUTH EVERY DAY   Quantity:  15 tablet   Refills:  0       * estradiol 0.5 MG tablet   Commonly known as:  ESTRACE   This may have changed:  You were already taking a medication with the same name, and this prescription was added. Make sure you understand how and when to take each.   Used for:  Symptoms, such as flushing, sleeplessness, headache, lack of concentration, associated with the menopause   Changed by:  Ricky Tom MD        Dose:  0.5 mg   Take 1 tablet (0.5 mg) by mouth daily   Quantity:  90 tablet   Refills:  3       * Notice:  This list has 2 medication(s) that are the same as other medications prescribed for you. Read the directions carefully, and ask your doctor or other care provider to review them with you.         Where to get your medicines      These medications were sent to [facility]pharmacy #[id number] - [place], [place] - [address] [facility]  [address] [place] [id number]     Phone:  [phone]     estradiol 0.5 MG tablet             "    Primary Care Provider Office Phone # Fax #    Sanjay Grissom [phone]       [facility] [address] [place] [address]        Equal Access to Services     SYMONE SOTOMAYOR : Gris adrienne mireles nikko Mariscal, waaminada luqadaha, qaybta kaalmada laith, milagro bankslatoya bryan. So [facility] [phone].    ATENCIÓN: Si habla español, tiene a fleming disposición servicios gratuitos de asistencia lingüística. Llame al [phone].    We comply with applicable federal civil rights laws and [place] laws. We do not discriminate on the basis of race, color, national origin, age, disability, sex, sexual orientation, or gender identity.            Thank you!     Thank you for choosing [facility]  for your care. Our goal is always to provide you with excellent care. Hearing back from our patients is one way we can continue to improve our services. Please take a few minutes to complete the written survey that you may receive in the mail after your visit with us. Thank you!             Your Updated Medication List - Protect others around you: Learn how to safely use, store and throw away your medicines at www.disposemymeds.org.          This list is accurate as of 3/12/18 11:56 AM.  Always use your most recent med list.                   Brand Name Dispense Instructions for use Diagnosis    acyclovir 400 MG tablet    ZOVIRAX    90 tablet    TAKE 1 TABLET(400 MG) BY MOUTH DAILY    HSV (herpes simplex virus) infection       * albuterol (2.5 MG/3ML) 0.083% neb solution      Inhale 2.5 mg into the lungs        * VENTOLIN  (90 BASE) MCG/ACT Inhaler   Generic drug:  albuterol      Inhale 2 puffs into the lungs        atenolol 25 MG tablet    TENORMIN     Take 12.5 mg by mouth        clobetasol 0.05 % cream    TEMOVATE    15 g    Apply topically daily Apply sparingly to affected area  daily for 14 days.  Do not apply to face carie    Acute vulvitis       * estradiol 1 MG  tablet    ESTRACE    15 tablet    TAKE 1/2 TABLET BY MOUTH EVERY DAY    Symptomatic menopausal or female climacteric states       * estradiol 0.5 MG tablet    ESTRACE    90 tablet    Take 1 tablet (0.5 mg) by mouth daily    Symptoms, such as flushing, sleeplessness, headache, lack of concentration, associated with the menopause       HYDROcodone-acetaminophen  MG per tablet    NORCO     Take 1 tablet by mouth every 6 hours as needed for moderate to severe pain        pravastatin 40 MG tablet    PRAVACHOL          SPIRIVA HANDIHALER 18 MCG capsule   Generic drug:  tiotropium           tiZANidine 4 MG tablet    ZANAFLEX          triamterene-hydrochlorothiazide 37.5-25 MG per tablet    MAXZIDE-25     Take 0.5 tablets by mouth        * Notice:  This list has 4 medication(s) that are the same as other medications prescribed for you. Read the directions carefully, and ask your doctor or other care provider to review them with you.

## 2018-03-12 NOTE — PROGRESS NOTES
Cheryle is a 63 year old No obstetric history on file. female who presents for annual exam.     Besides routine health maintenance,  she would like to discuss dysuria.    Do you have a Health Care Directive?: Yes: Patient states has Advance Directive and will bring in a copy to clinic.    Fall risk:   Fallen 2 or more times in the past year?: No  Any fall with injury in the past year?: No    HPI: The patient is seen at this time for her annual exam.  She complains of dysuria but no true frequency or hematuria.  She had a bladder infection 2 months ago and was treated with antibiotics.  She continues to take estradiol replacement.  The patient's PCP is LONG [facility]UKLA.      GYNECOLOGIC HISTORY:  No LMP recorded. Patient has had a hysterectomy..   reports that she has quit smoking. She has never used smokeless tobacco.    Patient is not sexually active.  STD testing offered?  Declined  Last PHQ-9 score on record=   PHQ-9 SCORE 3/12/2018   Total Score 6     Last GAD7 score on record=   AMANDA-7 SCORE 1/3/2017 3/12/2018   Total Score 7 6     Alcohol Score = 0    HEALTH MAINTENANCE:  Cholesterol: Through [facility]- 02/20/15  Total= 166, Triglycerides=193, HDL=67, LDL=60, FWA=158 (11/22/17), TSH=1.27 (07/21/17)  Last Mammo: 01/03/17, Result: normal, Next Mammo: today   Pap:   Lab Results   Component Value Date    PAP NIL 01/03/2017    PAP NIL 06/30/2015      DEXA:  06/30/15 normal  Colonoscopy:  08/13/12, Result:  normal, Next Colonoscopy: 4 years.    Health maintenance updated:  yes    HISTORY:  Obstetric History     No data available        Patient Active Problem List   Diagnosis     Essential hypertension, benign     Malignant neoplasm of lower lobe of left lung (H)     Past Surgical History:   Procedure Laterality Date     C LAT LUMBAR SPINE FUSION       HYSTERECTOMY  1999      Social History   Substance Use Topics     Smoking status: Former Smoker     Smokeless tobacco: Never Used      Comment: stopped 4/11/16     Alcohol  "use No      Problem (# of Occurrences) Relation (Name,Age of Onset)    Hypertension (1) Mother            Current Outpatient Prescriptions   Medication Sig     acyclovir (ZOVIRAX) 400 MG tablet TAKE 1 TABLET(400 MG) BY MOUTH DAILY     estradiol (ESTRACE) 1 MG tablet TAKE 1/2 TABLET BY MOUTH EVERY DAY     atenolol (TENORMIN) 25 MG tablet Take 12.5 mg by mouth     albuterol (2.5 MG/3ML) 0.083% neb solution Inhale 2.5 mg into the lungs     pravastatin (PRAVACHOL) 40 MG tablet      tiotropium (SPIRIVA HANDIHALER) 18 MCG capsule      triamterene-hydrochlorothiazide (MAXZIDE-25) 37.5-25 MG per tablet Take 0.5 tablets by mouth     albuterol (VENTOLIN HFA) 108 (90 BASE) MCG/ACT Inhaler Inhale 2 puffs into the lungs     HYDROcodone-acetaminophen (NORCO)  MG per tablet Take 1 tablet by mouth every 6 hours as needed for moderate to severe pain     clobetasol (TEMOVATE) 0.05 % cream Apply topically daily Apply sparingly to affected area  daily for 14 days.  Do not apply to face carie (Patient not taking: Reported on 3/12/2018)     tiZANidine (ZANAFLEX) 4 MG tablet      No current facility-administered medications for this visit.        Allergies   Allergen Reactions     Acetaminophen Itching     Albuterol Cough     Aspirin Nausea     \"hot ears\"     Atorvastatin      Other reaction(s): Myalgia     Codeine Nausea and Vomiting     Ipratropium-Albuterol Cough     Morphine      Other reaction(s): Vomiting     Oxycodone-Acetaminophen Itching       Past medical, surgical, social and family history were reviewed and updated in EPIC.    ROS:   12 point review of systems negative other than symptoms noted below.  Constitutional: Fatigue  Respiratory: Cough  Gastrointestinal: Constipation  Genitourinary: Painful Urination and Urgency  Skin: Skin Dryness  Neurologic: Tremors  Psychiatric: Difficulty Sleeping    EXAM:  /68  Pulse 68  Ht 5' 3.25\" (1.607 m)  Wt 168 lb (76.2 kg)  BMI 29.53 kg/m2   BMI: Body mass index is 29.53 " kg/(m^2).    EXAM:  Constitutional: Appearance: Well nourished, well developed alert, in no acute distress  Neck:  Lymph Nodes:  No lymphadenopathy present    Thyroid:  Gland size normal, nontender, no nodules or masses present  on palpation  Chest:  Respiratory Effort:  Breathing unlabored  Cardiovascular:Heart    Auscultation:  Regular rate, normal rhythm, no murmurs present  Breasts: Inspection of Breasts:  No lymphadenopathy present., Palpation of Breasts and Axillae:  No masses present on palpation, no breast tenderness., Axillary Lymph Nodes:  No lymphadenopathy present. and No nodularity, asymmetry or nipple discharge bilaterally.  Gastrointestinal:  Abdominal Examination:  Abdomen nontender to palpation, tone normal without     rigidity or guarding, no masses present, umbilicus without lesions    Liver and speen:  No hepatomegaly present, liver nontender to palpation    Hernias:  No hernias present  Lymphatic: Lymph Nodes:  No other lymphadenopathy present  Skin:  General Inspection:  No rashes present, no lesions present, no areas of  discoloration.    Genitalia and Groin:  No rashes present, no lesions present, no areas of  discoloration, no masses present  Neurologic/Psychiatric:    Mental Status:  Oriented X3     Pelvic Exam:  External Genitalia:     Normal appearance for age, no discharge present, no tenderness present, no inflammatory lesions present, color normal  Vagina:     Normal vaginal vault without central or paravaginal defects, no discharge present, no inflammatory lesions present, no masses present  Bladder:     Nontender to palpation  Urethra:   Urethral Body:  Urethra palpation normal, urethra structural support normal   Urethral Meatus:  No erythema or lesions present  Cervix:     Surgically absent  Uterus:     Surgically absent  Adnexa:     Surgically absent  Perineum:     Perineum within normal limits, no evidence of trauma, no rashes or skin lesions present  Anus:     Anus within normal  limits, no hemorrhoids present  Inguinal Lymph Nodes:     No lymphadenopathy present    COUNSELING:   Reviewed preventive health counseling, as reflected in patient instructions    BMI:  Body mass index is 29.53 kg/(m^2).  Weight management plan noted, stable and monitoring   reports that she has quit smoking. She has never used smokeless tobacco.      ASSESSMENT:  63 year old female with satisfactory annual exam.    ICD-10-CM    1. Dysuria R30.0 UA without Microscopic   2. Screening for cervical cancer Z12.4 Pap imaged thin layer screen with HPV - recommended age 30 - 65     HPV High Risk Types DNA Cervical       PLAN: The patient has mild bilateral breast tenderness on examination and I would like to move her down a dose level on her Estrace tablet.  Urinalysis is completely negative and we discussed having her decrease her caffeine.  She does not have a bladder infection at this time..  We will convey her Pap and mammogram results.  She does not need yearly Paps at this time.      Ricky Tom MD

## 2018-03-13 ASSESSMENT — PATIENT HEALTH QUESTIONNAIRE - PHQ9: SUM OF ALL RESPONSES TO PHQ QUESTIONS 1-9: 6

## 2018-03-13 ASSESSMENT — ANXIETY QUESTIONNAIRES: GAD7 TOTAL SCORE: 6

## 2018-03-15 LAB
COPATH REPORT: NORMAL
PAP: NORMAL

## 2018-03-16 LAB
FINAL DIAGNOSIS: NORMAL
HPV HR 12 DNA CVX QL NAA+PROBE: NEGATIVE
HPV16 DNA SPEC QL NAA+PROBE: NEGATIVE
HPV18 DNA SPEC QL NAA+PROBE: NEGATIVE
SPECIMEN DESCRIPTION: NORMAL
SPECIMEN SOURCE CVX/VAG CYTO: NORMAL

## 2018-06-08 ENCOUNTER — TELEPHONE (OUTPATIENT)
Dept: OBGYN | Facility: CLINIC | Age: 63
End: 2018-06-08

## 2018-06-08 DIAGNOSIS — N95.1 SYMPTOMS, SUCH AS FLUSHING, SLEEPLESSNESS, HEADACHE, LACK OF CONCENTRATION, ASSOCIATED WITH THE MENOPAUSE: ICD-10-CM

## 2018-06-08 NOTE — TELEPHONE ENCOUNTER
Prior Authorization Retail Medication Request    Medication/Dose: estradiol  ICD code (if different than what is on RX):    Previously Tried and Failed:    Rationale:  Stable on medication    Insurance Name: [phone]  Insurance ID:    [id number]

## 2018-06-11 NOTE — TELEPHONE ENCOUNTER
Prior Authorization Approval    Authorization Effective Date: 5/12/2018  Authorization Expiration Date: 6/11/2019  Medication: estradiol-APPROVED  Approved Dose/Quantity:    Reference #: [id number]   Insurance Company: [employer] - Phone [phone] Fax [phone]  Expected CoPay:       CoPay Card Available:      Foundation Assistance Needed:    Which Pharmacy is filling the prescription (Not needed for infusion/clinic administered): [id number] - [place], [place] - [address]  Pharmacy Notified: Yes  Patient Notified: Yes

## 2018-06-11 NOTE — TELEPHONE ENCOUNTER
Central Prior Authorization Team   Phone: [phone]    PA Initiation    Medication: estradiol  Insurance Company: [employer] - Phone [phone] Fax [phone]  Pharmacy Filling the Rx: [id number] - [place] - [address] [facility]  Filling Pharmacy Phone: [phone]  Filling Pharmacy Fax: [phone]  Start Date: 6/11/2018

## 2018-06-15 DIAGNOSIS — B00.9 HSV (HERPES SIMPLEX VIRUS) INFECTION: ICD-10-CM

## 2018-06-15 RX ORDER — ACYCLOVIR 400 MG/1
TABLET ORAL
Qty: 90 TABLET | Refills: 0 | Status: SHIPPED | OUTPATIENT
Start: 2018-06-15 | End: 2018-10-23

## 2018-11-28 DIAGNOSIS — N95.1 SYMPTOMS, SUCH AS FLUSHING, SLEEPLESSNESS, HEADACHE, LACK OF CONCENTRATION, ASSOCIATED WITH THE MENOPAUSE: ICD-10-CM

## 2018-11-28 RX ORDER — ESTRADIOL 0.5 MG/1
0.5 TABLET ORAL DAILY
Qty: 90 TABLET | Refills: 3 | OUTPATIENT
Start: 2018-11-28

## 2018-11-28 NOTE — TELEPHONE ENCOUNTER
Refill request refused as too early to request renewal. Verified with pharmacy she has 1 refill available.

## 2018-11-28 NOTE — TELEPHONE ENCOUNTER
"Requested Prescriptions   Pending Prescriptions Disp Refills     estradiol (ESTRACE) 0.5 MG tablet 90 tablet 3     Sig: Take 1 tablet (0.5 mg) by mouth daily    Hormone Replacement Therapy Passed    11/28/2018 11:19 AM       Passed - Blood pressure under 140/90 in past 12 months    BP Readings from Last 3 Encounters:   03/12/18 114/68   02/27/17 138/80   02/13/17 130/76                Passed - Recent (12 mo) or future (30 days) visit within the authorizing provider's specialty    Patient had office visit in the last 12 months or has a visit in the next 30 days with authorizing provider or within the authorizing provider's specialty.  See \"Patient Info\" tab in inbasket, or \"Choose Columns\" in Meds & Orders section of the refill encounter.             Passed - Patient has mammogram in past 2 years on file if age 50-75       Passed - Patient is 18 years of age or older       Passed - No active pregnancy on record       Passed - No positive pregnancy test on record in past 12 months        Last Written Prescription Date:  03/12/18  Last Fill Quantity: 90,  # refills: 3   Last office visit: 3/12/2018 with prescribing provider:  Dr Tom   Future Office Visit:  None    "

## 2019-01-14 DIAGNOSIS — B00.9 HSV (HERPES SIMPLEX VIRUS) INFECTION: ICD-10-CM

## 2019-01-14 RX ORDER — ACYCLOVIR 400 MG/1
400 TABLET ORAL DAILY
Qty: 90 TABLET | Refills: 0 | Status: SHIPPED | OUTPATIENT
Start: 2019-01-14 | End: 2019-04-15

## 2019-01-14 NOTE — TELEPHONE ENCOUNTER
"Requested Prescriptions   Pending Prescriptions Disp Refills     acyclovir (ZOVIRAX) 400 MG tablet 90 tablet 3     Sig: Take 1 tablet (400 mg) by mouth daily    Antivirals for Herpes Protocol Failed - 1/14/2019  4:09 PM       Failed - Normal serum creatinine on file in past 12 months    No lab results found.         Passed - Patient is age 12 or older       Passed - Recent (12 mo) or future (30 days) visit within the authorizing provider's specialty    Patient had office visit in the last 12 months or has a visit in the next 30 days with authorizing provider or within the authorizing provider's specialty.  See \"Patient Info\" tab in inbasket, or \"Choose Columns\" in Meds & Orders section of the refill encounter.             Passed - Medication is active on med list        Last Written Prescription Date:  10/23/18  Last Fill Quantity: 90,  # refills: 0   Last office visit: 3/12/2018 with prescribing provider:  Dr Tom   Future Office Visit:  None  "

## 2019-02-28 DIAGNOSIS — N95.1 SYMPTOMS, SUCH AS FLUSHING, SLEEPLESSNESS, HEADACHE, LACK OF CONCENTRATION, ASSOCIATED WITH THE MENOPAUSE: ICD-10-CM

## 2019-02-28 RX ORDER — ESTRADIOL 0.5 MG/1
0.5 TABLET ORAL DAILY
Qty: 28 TABLET | Refills: 0 | Status: SHIPPED | OUTPATIENT
Start: 2019-02-28 | End: 2019-03-31

## 2019-02-28 NOTE — TELEPHONE ENCOUNTER
"Requested Prescriptions   Pending Prescriptions Disp Refills     estradiol (ESTRACE) 0.5 MG tablet [Pharmacy Med Name: ESTRADIOL 0.5 MG TABLET] 90 tablet 3     Sig: TAKE 1 TABLET (0.5 MG) BY MOUTH DAILY    Hormone Replacement Therapy Passed - 2/28/2019  1:37 AM       Passed - Blood pressure under 140/90 in past 12 months    BP Readings from Last 3 Encounters:   03/12/18 114/68   02/27/17 138/80   02/13/17 130/76                Passed - Recent (12 mo) or future (30 days) visit within the authorizing provider's specialty    Patient had office visit in the last 12 months or has a visit in the next 30 days with authorizing provider or within the authorizing provider's specialty.  See \"Patient Info\" tab in inbasket, or \"Choose Columns\" in Meds & Orders section of the refill encounter.             Passed - Patient has mammogram in past 2 years on file if age 50-75       Passed - Medication is active on med list       Passed - Patient is 18 years of age or older       Passed - No active pregnancy on record       Passed - No positive pregnancy test on record in past 12 months        Last Written Prescription Date:  3/12/18  Last Fill Quantity: 90,  # refills: 3   Last office visit: 3/12/2018 with prescribing provider:  Negro   Future Office Visit:  None    Medication is being filled for 1 time refill only due to:  pt is due for an annual exam in March   Ju Del Rio RN on 2/28/2019 at 8:47 AM        "

## 2019-03-31 DIAGNOSIS — N95.1 SYMPTOMS, SUCH AS FLUSHING, SLEEPLESSNESS, HEADACHE, LACK OF CONCENTRATION, ASSOCIATED WITH THE MENOPAUSE: ICD-10-CM

## 2019-04-01 RX ORDER — ESTRADIOL 0.5 MG/1
TABLET ORAL
Qty: 28 TABLET | Refills: 0 | Status: SHIPPED | OUTPATIENT
Start: 2019-04-01 | End: 2019-07-01

## 2019-04-01 NOTE — TELEPHONE ENCOUNTER
"Requested Prescriptions   Pending Prescriptions Disp Refills     estradiol (ESTRACE) 0.5 MG tablet [Pharmacy Med Name: ESTRADIOL 0.5 MG TABLET] 28 tablet 0     Sig: TAKE 1 TABLET (0.5 MG) BY MOUTH DAILY**PATIENT IS DUE FOR ANNUAL EXAM IN MARCH**    Hormone Replacement Therapy Failed - 3/31/2019  4:21 AM       Failed - Blood pressure under 140/90 in past 12 months    BP Readings from Last 3 Encounters:   03/12/18 114/68   02/27/17 138/80   02/13/17 130/76                Failed - Recent (12 mo) or future (30 days) visit within the authorizing provider's specialty    Patient had office visit in the last 12 months or has a visit in the next 30 days with authorizing provider or within the authorizing provider's specialty.  See \"Patient Info\" tab in inbasket, or \"Choose Columns\" in Meds & Orders section of the refill encounter.             Passed - Patient has mammogram in past 2 years on file if age 50-75       Passed - Medication is active on med list       Passed - Patient is 18 years of age or older       Passed - No active pregnancy on record       Passed - No positive pregnancy test on record in past 12 months        Last Written Prescription Date:  2/28/19  Last Fill Quantity: 28,  # refills: 0   Last office visit: 3/12/2018 with prescribing provider:  Ricky silvestre   Future Office Visit:  None    Pt needs an OV for further refills  Ju Del Rio RN on 4/1/2019 at 9:21 AM      "

## 2019-04-15 DIAGNOSIS — B00.9 HSV (HERPES SIMPLEX VIRUS) INFECTION: ICD-10-CM

## 2019-04-15 RX ORDER — ACYCLOVIR 400 MG/1
400 TABLET ORAL DAILY
Qty: 30 TABLET | Refills: 0 | Status: SHIPPED | OUTPATIENT
Start: 2019-04-15 | End: 2019-07-01

## 2019-04-15 NOTE — TELEPHONE ENCOUNTER
"Requested Prescriptions   Pending Prescriptions Disp Refills     acyclovir (ZOVIRAX) 400 MG tablet [Pharmacy Med Name: ACYCLOVIR 400 MG TABLET] 90 tablet 0     Sig: TAKE 1 TABLET (400 MG) BY MOUTH DAILY       Antivirals for Herpes Protocol Failed - 4/15/2019  1:30 PM        Failed - Recent (12 mo) or future (30 days) visit within the authorizing provider's specialty     Patient had office visit in the last 12 months or has a visit in the next 30 days with authorizing provider or within the authorizing provider's specialty.  See \"Patient Info\" tab in inbasket, or \"Choose Columns\" in Meds & Orders section of the refill encounter.              Failed - Normal serum creatinine on file in past 12 months     No lab results found.          Passed - Patient is age 12 or older        Passed - Medication is active on med list        Medication is being filled for 1 time refill only due to:  pt needs an appointment for further refills   Ju Del Rio RN on 4/15/2019 at 1:33 PM      "

## 2019-05-04 DIAGNOSIS — N95.1 SYMPTOMS, SUCH AS FLUSHING, SLEEPLESSNESS, HEADACHE, LACK OF CONCENTRATION, ASSOCIATED WITH THE MENOPAUSE: ICD-10-CM

## 2019-05-06 RX ORDER — ESTRADIOL 0.5 MG/1
TABLET ORAL
Qty: 28 TABLET | Refills: 0 | OUTPATIENT
Start: 2019-05-06

## 2019-05-06 NOTE — TELEPHONE ENCOUNTER
"Requested Prescriptions   Pending Prescriptions Disp Refills     estradiol (ESTRACE) 0.5 MG tablet [Pharmacy Med Name: ESTRADIOL 0.5 MG TABLET] 28 tablet 0     Sig: TAKE 1 TABLET (0.5 MG) BY MOUTH DAILY**PATIENT IS DUE FOR ANNUAL EXAM IN MARCH**       Hormone Replacement Therapy Failed - 5/4/2019 12:26 AM        Failed - Blood pressure under 140/90 in past 12 months     BP Readings from Last 3 Encounters:   03/12/18 114/68   02/27/17 138/80   02/13/17 130/76                 Failed - Recent (12 mo) or future (30 days) visit within the authorizing provider's specialty     Patient had office visit in the last 12 months or has a visit in the next 30 days with authorizing provider or within the authorizing provider's specialty.  See \"Patient Info\" tab in inbasket, or \"Choose Columns\" in Meds & Orders section of the refill encounter.              Passed - Patient has mammogram in past 2 years on file if age 50-75        Passed - Medication is active on med list        Passed - Patient is 18 years of age or older        Passed - No active pregnancy on record        Passed - No positive pregnancy test on record in past 12 months      Last Written Prescription Date:  4/1/19  Last Fill Quantity: 28,  # refills: 0   Last office visit: 3/12/2018 with prescribing provider:  Negro   Future Office Visit:    "

## 2019-05-08 DIAGNOSIS — N95.1 SYMPTOMS, SUCH AS FLUSHING, SLEEPLESSNESS, HEADACHE, LACK OF CONCENTRATION, ASSOCIATED WITH THE MENOPAUSE: ICD-10-CM

## 2019-05-08 RX ORDER — ESTRADIOL 0.5 MG/1
TABLET ORAL
Qty: 28 TABLET | Refills: 0 | OUTPATIENT
Start: 2019-05-08

## 2019-05-08 NOTE — TELEPHONE ENCOUNTER
"Requested Prescriptions   Pending Prescriptions Disp Refills     estradiol (ESTRACE) 0.5 MG tablet [Pharmacy Med Name: ESTRADIOL 0.5 MG TABLET] 28 tablet 0     Sig: TAKE 1 TABLET (0.5 MG) BY MOUTH DAILY**PATIENT IS DUE FOR ANNUAL EXAM IN MARCH**       Hormone Replacement Therapy Failed - 5/8/2019  9:59 AM        Failed - Blood pressure under 140/90 in past 12 months     BP Readings from Last 3 Encounters:   03/12/18 114/68   02/27/17 138/80   02/13/17 130/76                 Failed - Recent (12 mo) or future (30 days) visit within the authorizing provider's specialty     Patient had office visit in the last 12 months or has a visit in the next 30 days with authorizing provider or within the authorizing provider's specialty.  See \"Patient Info\" tab in inbasket, or \"Choose Columns\" in Meds & Orders section of the refill encounter.              Passed - Patient has mammogram in past 2 years on file if age 50-75        Passed - Medication is active on med list        Passed - Patient is 18 years of age or older        Passed - No active pregnancy on record        Passed - No positive pregnancy test on record in past 12 months        Pt due for annual, no appt scheduled. Pt already received one month extension. Rx denied.   Ju Del Rio RN on 5/8/2019 at 10:35 AM    "

## 2019-05-16 ENCOUNTER — TELEPHONE (OUTPATIENT)
Dept: OBGYN | Facility: CLINIC | Age: 64
End: 2019-05-16

## 2019-05-16 DIAGNOSIS — N95.1 SYMPTOMS, SUCH AS FLUSHING, SLEEPLESSNESS, HEADACHE, LACK OF CONCENTRATION, ASSOCIATED WITH THE MENOPAUSE: Primary | ICD-10-CM

## 2019-05-16 RX ORDER — ESTRADIOL 1 MG/1
0.5 TABLET ORAL DAILY
Qty: 60 TABLET | Refills: 0 | Status: SHIPPED | OUTPATIENT
Start: 2019-05-16 | End: 2019-07-01

## 2019-05-16 NOTE — TELEPHONE ENCOUNTER
Pt calling to request Estrace. Did make her annual appt.  Pt aware I am only able to send in RX to last her until her appt. She must be seen for further medication refills

## 2019-05-29 ENCOUNTER — TELEPHONE (OUTPATIENT)
Dept: OBGYN | Facility: CLINIC | Age: 64
End: 2019-05-29

## 2019-05-30 NOTE — TELEPHONE ENCOUNTER
PA Initiation    Medication: estradiol (ESTRACE) 0.5 MG tablet - INITIATED  Insurance Company: [employer] - Phone [phone] Fax [phone]  Pharmacy [facility]illing the Rx: [id number] [place] - [address]  Filling Pharmacy Phone: [phone]  Filling Pharmacy Fax:    Start Date: 5/30/2019

## 2019-06-11 ENCOUNTER — TELEPHONE (OUTPATIENT)
Dept: OBGYN | Facility: CLINIC | Age: 64
End: 2019-06-11

## 2019-06-11 DIAGNOSIS — Z13.820 SCREENING FOR OSTEOPOROSIS: Primary | ICD-10-CM

## 2019-06-11 DIAGNOSIS — N95.9 MENOPAUSAL AND PERIMENOPAUSAL DISORDER: ICD-10-CM

## 2019-06-26 NOTE — PROGRESS NOTES
Cheryle is a 64 year old No obstetric history on file. female who presents for annual exam.     Besides routine health maintenance, she has no other health concerns today .    Do you have a Health Care Directive?: Yes: Patient states has Advance Directive and will bring in a copy to clinic.    Fall risk:   Fallen 2 or more times in the past year?: No  Any fall with injury in the past year?: No    HPI: Patient is seen at this time for her modified Medicare GYN exam.  She has no new health issues.  She has had a hysterectomy and will not need a Pap this year.  The patient's PCP is [facility].     GYNECOLOGIC HISTORY:  No LMP recorded. Patient has had a hysterectomy..   reports that she has quit smoking. She has never used smokeless tobacco.    Patient is not sexually active.  STD testing offered?  Declined  Last PHQ-9 score on record=   PHQ-9 SCORE 7/1/2019   PHQ-9 Total Score 10     Last GAD7 score on record=   AMANDA-7 SCORE 1/3/2017 3/12/2018 7/1/2019   Total Score 7 6 9     Alcohol Score = 0    HEALTH MAINTENANCE:  Cholesterol: 2/20/15 @ [facility]   Total= 166, Triglycerides=193, HDL=67, LDL=60, DGC=486 (11/22/17), TSH=1.27 (7/21/17)    Last Mammo: 3/12/18, Result: normal, Next Mammo: today     Pap: 3/12/18 NIL, HPV -  Lab Results   Component Value Date    PAP NIL 03/12/2018    PAP NIL 01/03/2017    PAP NIL 06/30/2015      DEXA:  6/30/15  Colonoscopy:  8/13/12 @ [facility], Result:  normal, Next Colonoscopy: patient unsure - thinks 10 years.    Health maintenance updated:  no, Pap due    HISTORY:  OB History   No data available     Patient Active Problem List   Diagnosis     Essential hypertension, benign     Malignant neoplasm of lower lobe of left lung (H)     Past Surgical History:   Procedure Laterality Date     C LAT LUMBAR SPINE FUSION       HYSTERECTOMY  1999     HYSTERECTOMY, PAP NO LONGER INDICATED        Social History     Tobacco Use     Smoking status: Former Smoker     Smokeless tobacco: Never Used      "Tobacco comment: stopped 4/11/16   Substance Use Topics     Alcohol use: No      Problem (# of Occurrences) Relation (Name,Age of Onset)    Hypertension (1) Mother            Current Outpatient Medications   Medication Sig     acyclovir (ZOVIRAX) 400 MG tablet TAKE 1 TABLET (400 MG) BY MOUTH DAILY     albuterol (2.5 MG/3ML) 0.083% neb solution Inhale 2.5 mg into the lungs     albuterol (VENTOLIN HFA) 108 (90 BASE) MCG/ACT Inhaler Inhale 2 puffs into the lungs     atenolol (TENORMIN) 25 MG tablet Take 12.5 mg by mouth     estradiol (ESTRACE) 1 MG tablet TAKE 1/2 TABLET BY MOUTH EVERY DAY     fluticasone-salmeterol (ADVAIR DISKUS) 250-50 MCG/DOSE inhaler Inhale 1 puff into the lungs 2 times daily     HYDROcodone-acetaminophen (NORCO)  MG per tablet Take 1 tablet by mouth every 6 hours as needed for moderate to severe pain     pravastatin (PRAVACHOL) 40 MG tablet      tiZANidine (ZANAFLEX) 4 MG tablet      triamterene-hydrochlorothiazide (MAXZIDE-25) 37.5-25 MG per tablet Take 0.5 tablets by mouth     No current facility-administered medications for this visit.        Allergies   Allergen Reactions     Acetaminophen Itching     Albuterol Cough     Aspirin Nausea     \"hot ears\"     Atorvastatin      Other reaction(s): Myalgia     Codeine Nausea and Vomiting     Ipratropium-Albuterol Cough     Morphine      Other reaction(s): Vomiting     Oxycodone-Acetaminophen Itching       Past medical, surgical, social and family history were reviewed and updated in EPIC.    ROS:   12 point review of systems negative other than symptoms noted below.  Constitutional: Fatigue and Weight Gain  Eyes: Spots  Head: Nasal Congestion  Cardiovascular: Palpitations  Respiratory: Cough, Shortness of Breath and Wheezing  Gastrointestinal: Constipation and Heartburn  Genitourinary: Hot Flashes, Incontinence and Night Sweats  Skin: Skin Dryness  Neurologic: Tremors  Musculoskeletal: Joint Pain and Muscular Weakness  Endocrine: Decreased " "Libido  Psychiatric: Anxiety, Difficulty Sleeping and Ferguson    EXAM:  /80   Pulse 84   Resp 20   Ht 1.6 m (5' 3\")   Wt 75.8 kg (167 lb)   BMI 29.58 kg/m     BMI: Body mass index is 29.58 kg/m .    EXAM:  Constitutional: Appearance: Well nourished, well developed alert, in no acute distress  Neck:  Lymph Nodes:  No lymphadenopathy present    Thyroid:  Gland size normal, nontender, no nodules or masses present  on palpation  Chest:  Respiratory Effort:  Breathing unlabored  Cardiovascular:Heart    Auscultation:  Regular rate, normal rhythm, no murmurs present  Breasts: Inspection of Breasts:  No lymphadenopathy present., Palpation of Breasts and Axillae:  No masses present on palpation, no breast tenderness., Axillary Lymph Nodes:  No lymphadenopathy present. and No nodularity, asymmetry or nipple discharge bilaterally.  Gastrointestinal:  Abdominal Examination:  Abdomen nontender to palpation, tone normal without     rigidity or guarding, no masses present, umbilicus without lesions    Liver and speen:  No hepatomegaly present, liver nontender to palpation    Hernias:  No hernias present  Lymphatic: Lymph Nodes:  No other lymphadenopathy present  Skin:  General Inspection:  No rashes present, no lesions present, no areas of  discoloration.    Genitalia and Groin:  No rashes present, no lesions present, no areas of  discoloration, no masses present  Neurologic/Psychiatric:    Mental Status:  Oriented X3     Pelvic Exam:  External Genitalia:     Normal appearance for age, no discharge present, no tenderness present, no inflammatory lesions present, color normal  Vagina:     Normal vaginal vault without central or paravaginal defects, no discharge present, no inflammatory lesions present, no masses present  Bladder:     Nontender to palpation  Urethra:   Urethral Body:  Urethra palpation normal, urethra structural support normal   Urethral Meatus:  No erythema or lesions present  Cervix:     Surgically " absent  Uterus:     Surgically absent  Adnexa:     Surgically absent  Perineum:     Perineum within normal limits, no evidence of trauma, no rashes or skin lesions present  Anus:     Anus within normal limits, no hemorrhoids present  Inguinal Lymph Nodes:     No lymphadenopathy present    COUNSELING:   Reviewed preventive health counseling, as reflected in patient instructions       Regular exercise       Healthy diet/nutrition    BMI:  Body mass index is 29.58 kg/m .  Weight management plan: Discussed healthy diet and exercise guidelines   reports that she has quit smoking. She has never used smokeless tobacco.      ASSESSMENT:  64 year old female with satisfactory annual exam.    ICD-10-CM    1. Encounter for gynecological examination without abnormal finding Z01.419    2. Symptomatic menopausal or female climacteric states N95.1 estradiol (ESTRACE) 1 MG tablet   3. HSV (herpes simplex virus) infection B00.9 acyclovir (ZOVIRAX) 400 MG tablet       PLAN: We will convey the patient's mammogram results.  She is not due for a Pap this year.  We will refill her Estrace and acyclovir.      Ricky Tom MD

## 2019-07-01 ENCOUNTER — OFFICE VISIT (OUTPATIENT)
Dept: OBGYN | Facility: CLINIC | Age: 64
End: 2019-07-01
Payer: COMMERCIAL

## 2019-07-01 ENCOUNTER — ANCILLARY PROCEDURE (OUTPATIENT)
Dept: MAMMOGRAPHY | Facility: CLINIC | Age: 64
End: 2019-07-01
Payer: COMMERCIAL

## 2019-07-01 ENCOUNTER — ANCILLARY PROCEDURE (OUTPATIENT)
Dept: BONE DENSITY | Facility: CLINIC | Age: 64
End: 2019-07-01
Payer: COMMERCIAL

## 2019-07-01 VITALS
HEART RATE: 84 BPM | SYSTOLIC BLOOD PRESSURE: 146 MMHG | RESPIRATION RATE: 20 BRPM | DIASTOLIC BLOOD PRESSURE: 80 MMHG | HEIGHT: 63 IN | WEIGHT: 167 LBS | BODY MASS INDEX: 29.59 KG/M2

## 2019-07-01 DIAGNOSIS — N95.9 MENOPAUSAL AND PERIMENOPAUSAL DISORDER: ICD-10-CM

## 2019-07-01 DIAGNOSIS — Z13.820 SCREENING FOR OSTEOPOROSIS: ICD-10-CM

## 2019-07-01 DIAGNOSIS — Z12.31 VISIT FOR SCREENING MAMMOGRAM: ICD-10-CM

## 2019-07-01 DIAGNOSIS — B00.9 HSV (HERPES SIMPLEX VIRUS) INFECTION: ICD-10-CM

## 2019-07-01 DIAGNOSIS — Z01.419 ENCOUNTER FOR GYNECOLOGICAL EXAMINATION WITHOUT ABNORMAL FINDING: Primary | ICD-10-CM

## 2019-07-01 DIAGNOSIS — N95.1 SYMPTOMATIC MENOPAUSAL OR FEMALE CLIMACTERIC STATES: ICD-10-CM

## 2019-07-01 DIAGNOSIS — Z78.0 ASYMPTOMATIC POSTMENOPAUSAL STATE: ICD-10-CM

## 2019-07-01 PROCEDURE — 99397 PER PM REEVAL EST PAT 65+ YR: CPT | Performed by: OBSTETRICS & GYNECOLOGY

## 2019-07-01 PROCEDURE — 77080 DXA BONE DENSITY AXIAL: CPT | Mod: 59 | Performed by: OBSTETRICS & GYNECOLOGY

## 2019-07-01 PROCEDURE — 77081 DXA BONE DENSITY APPENDICULR: CPT | Performed by: OBSTETRICS & GYNECOLOGY

## 2019-07-01 PROCEDURE — 77067 SCR MAMMO BI INCL CAD: CPT | Mod: TC

## 2019-07-01 RX ORDER — ACYCLOVIR 400 MG/1
400 TABLET ORAL DAILY
Qty: 90 TABLET | Refills: 3 | Status: SHIPPED | OUTPATIENT
Start: 2019-07-01 | End: 2020-06-23

## 2019-07-01 RX ORDER — ESTRADIOL 1 MG/1
0.5 TABLET ORAL DAILY
Qty: 45 TABLET | Refills: 3 | Status: SHIPPED | OUTPATIENT
Start: 2019-07-01 | End: 2020-06-23

## 2019-07-01 ASSESSMENT — ANXIETY QUESTIONNAIRES
IF YOU CHECKED OFF ANY PROBLEMS ON THIS QUESTIONNAIRE, HOW DIFFICULT HAVE THESE PROBLEMS MADE IT FOR YOU TO DO YOUR WORK, TAKE CARE OF THINGS AT HOME, OR GET ALONG WITH OTHER PEOPLE: VERY DIFFICULT
1. FEELING NERVOUS, ANXIOUS, OR ON EDGE: SEVERAL DAYS
3. WORRYING TOO MUCH ABOUT DIFFERENT THINGS: MORE THAN HALF THE DAYS
5. BEING SO RESTLESS THAT IT IS HARD TO SIT STILL: NOT AT ALL
6. BECOMING EASILY ANNOYED OR IRRITABLE: MORE THAN HALF THE DAYS
7. FEELING AFRAID AS IF SOMETHING AWFUL MIGHT HAPPEN: SEVERAL DAYS
2. NOT BEING ABLE TO STOP OR CONTROL WORRYING: MORE THAN HALF THE DAYS
GAD7 TOTAL SCORE: 9

## 2019-07-01 ASSESSMENT — PATIENT HEALTH QUESTIONNAIRE - PHQ9
SUM OF ALL RESPONSES TO PHQ QUESTIONS 1-9: 10
5. POOR APPETITE OR OVEREATING: SEVERAL DAYS

## 2019-07-01 ASSESSMENT — MIFFLIN-ST. JEOR: SCORE: 1266.64

## 2019-07-02 ASSESSMENT — ANXIETY QUESTIONNAIRES: GAD7 TOTAL SCORE: 9

## 2019-09-28 ENCOUNTER — HEALTH MAINTENANCE LETTER (OUTPATIENT)
Age: 64
End: 2019-09-28

## 2020-06-23 DIAGNOSIS — B00.9 HSV (HERPES SIMPLEX VIRUS) INFECTION: ICD-10-CM

## 2020-06-23 DIAGNOSIS — N95.1 SYMPTOMATIC MENOPAUSAL OR FEMALE CLIMACTERIC STATES: ICD-10-CM

## 2020-06-23 RX ORDER — ESTRADIOL 1 MG/1
0.5 TABLET ORAL DAILY
Qty: 45 TABLET | Refills: 0 | Status: SHIPPED | OUTPATIENT
Start: 2020-06-23 | End: 2020-10-16

## 2020-06-23 RX ORDER — ACYCLOVIR 400 MG/1
400 TABLET ORAL DAILY
Qty: 90 TABLET | Refills: 0 | Status: SHIPPED | OUTPATIENT
Start: 2020-06-23 | End: 2021-03-24

## 2020-06-23 NOTE — TELEPHONE ENCOUNTER
"Requested Prescriptions   Pending Prescriptions Disp Refills     acyclovir (ZOVIRAX) 400 MG tablet 90 tablet 3     Sig: Take 1 tablet (400 mg) by mouth daily       Antivirals for Herpes Protocol Failed - 6/23/2020 11:22 AM        Failed - Normal serum creatinine on file in past 12 months     No lab results found.    Ok to refill medication if creatinine is low          Passed - Patient is age 12 or older        Passed - Recent (12 mo) or future (30 days) visit within the authorizing provider's specialty     Patient has had an office visit with the authorizing provider or a provider within the authorizing providers department within the previous 12 mos or has a future within next 30 days. See \"Patient Info\" tab in inbasket, or \"Choose Columns\" in Meds & Orders section of the refill encounter.              Passed - Medication is active on med list           Last Written Prescription Date:  7/1/19  Last Fill Quantity: 90,  # refills: 3   Last office visit: 7/1/2019 with prescribing provider:  Dr. Tom   Future Office Visit:  none    Refill sent  Ju Del Rio RN on 6/23/2020 at 11:27 AM        "

## 2020-06-23 NOTE — TELEPHONE ENCOUNTER
"Requested Prescriptions   Pending Prescriptions Disp Refills     estradiol (ESTRACE) 1 MG tablet 45 tablet 3     Sig: Take 0.5 tablets (0.5 mg) by mouth daily       Hormone Replacement Therapy Failed - 6/23/2020 11:26 AM        Failed - Blood pressure under 140/90 in past 12 months     BP Readings from Last 3 Encounters:   07/01/19 146/80   03/12/18 114/68   02/27/17 138/80                 Passed - Recent (12 mo) or future (30 days) visit within the authorizing provider's specialty     Patient has had an office visit with the authorizing provider or a provider within the authorizing providers department within the previous 12 mos or has a future within next 30 days. See \"Patient Info\" tab in inbasket, or \"Choose Columns\" in Meds & Orders section of the refill encounter.              Passed - Patient has mammogram in past 2 years on file if age 50-75        Passed - Medication is active on med list        Passed - Patient is 18 years of age or older        Passed - No active pregnancy on record        Passed - No positive pregnancy test on record in past 12 months           Last Written Prescription Date:  7/1/19  Last Fill Quantity: 45,  # refills: 3   Last office visit: 7/1/2019 with prescribing provider:  Dr. Tom   Future Office Visit:  none    Refill sent  Appointment needed for further refills  Ju Del Rio RN on 6/23/2020 at 11:30 AM        "

## 2020-10-15 ENCOUNTER — TELEPHONE (OUTPATIENT)
Dept: OBGYN | Facility: CLINIC | Age: 65
End: 2020-10-15

## 2020-10-15 DIAGNOSIS — N95.1 SYMPTOMATIC MENOPAUSAL OR FEMALE CLIMACTERIC STATES: ICD-10-CM

## 2020-10-15 RX ORDER — ESTRADIOL 1 MG/1
0.5 TABLET ORAL DAILY
Qty: 45 TABLET | Refills: 0 | OUTPATIENT
Start: 2020-10-15

## 2020-10-15 NOTE — TELEPHONE ENCOUNTER
Overdue for an Office visit. Last OV 7/1/2019  No future appt scheduled.  [employer]ade[employer] message sent to patient.      Jen Villagomez RN

## 2020-10-15 NOTE — TELEPHONE ENCOUNTER
Patient needs appointment for refill on estradiol but is worried about coming in during COVID due to lung condition - missing half a lung. Please consult with Dr Tom about need for in person vs phone visit and call the patient back to advise.

## 2020-10-15 NOTE — TELEPHONE ENCOUNTER
"Requested Prescriptions   Pending Prescriptions Disp Refills     estradiol (ESTRACE) 1 MG tablet 45 tablet 0     Sig: Take 0.5 tablets (0.5 mg) by mouth daily       Hormone Replacement Therapy Failed - 10/15/2020  3:31 PM        Failed - Blood pressure under 140/90 in past 12 months     BP Readings from Last 3 Encounters:   07/01/19 146/80   03/12/18 114/68   02/27/17 138/80                 Failed - Recent (12 mo) or future (30 days) visit within the authorizing provider's specialty     Patient has had an office visit with the authorizing provider or a provider within the authorizing providers department within the previous 12 mos or has a future within next 30 days. See \"Patient Info\" tab in inbasket, or \"Choose Columns\" in Meds & Orders section of the refill encounter.              Passed - Patient has mammogram in past 2 years on file if age 50-75        Passed - Medication is active on med list        Passed - Patient is 18 years of age or older        Passed - No active pregnancy on record        Passed - No positive pregnancy test on record in past 12 months           Last Written Prescription Date:  06/23/20  Last Fill Quantity: 45,  # refills: 0   Last office visit: 7/1/2019 with prescribing provider:  Negro   Future Office Visit: None found          "

## 2020-10-16 DIAGNOSIS — N95.1 SYMPTOMATIC MENOPAUSAL OR FEMALE CLIMACTERIC STATES: ICD-10-CM

## 2020-10-16 RX ORDER — ESTRADIOL 1 MG/1
0.5 TABLET ORAL DAILY
Qty: 45 TABLET | Refills: 0 | Status: SHIPPED | OUTPATIENT
Start: 2020-10-16 | End: 2021-01-08

## 2020-10-16 NOTE — TELEPHONE ENCOUNTER
"Requested Prescriptions   Pending Prescriptions Disp Refills     estradiol (ESTRACE) 1 MG tablet 45 tablet 0     Sig: Take 0.5 tablets (0.5 mg) by mouth daily       Hormone Replacement Therapy Failed - 10/16/2020  9:04 AM        Failed - Blood pressure under 140/90 in past 12 months     BP Readings from Last 3 Encounters:   07/01/19 146/80   03/12/18 114/68   02/27/17 138/80                 Failed - Recent (12 mo) or future (30 days) visit within the authorizing provider's specialty     Patient has had an office visit with the authorizing provider or a provider within the authorizing providers department within the previous 12 mos or has a future within next 30 days. See \"Patient Info\" tab in inbasket, or \"Choose Columns\" in Meds & Orders section of the refill encounter.              Passed - Patient has mammogram in past 2 years on file if age 50-75        Passed - Medication is active on med list        Passed - Patient is 18 years of age or older        Passed - No active pregnancy on record        Passed - No positive pregnancy test on record in past 12 months           Next 5 appointments (look out 90 days)    Dec 30, 2020  1:00 PM  PHYSICAL with Ricky Tom MD  [facility] ([facility]) [address] [place] [id number] [phone]        Prescription approved per [facility].  Ju Del Rio RN on 10/16/2020 at 10:42 AM    "

## 2020-10-16 NOTE — TELEPHONE ENCOUNTER
We are taking all of the appropriate precautions with COVID, she needs to come in for a quick visit. It's been well over 1 year. She needs to be seen in the office. No fills until seen.

## 2020-10-16 NOTE — TELEPHONE ENCOUNTER
patient informed of Tamra Bellamy's recommendations below. Transferred to scheduling to make an appointment.    Jen Villagomez RN

## 2021-01-08 DIAGNOSIS — N95.1 SYMPTOMATIC MENOPAUSAL OR FEMALE CLIMACTERIC STATES: ICD-10-CM

## 2021-01-08 RX ORDER — ESTRADIOL 1 MG/1
TABLET ORAL
Qty: 30 TABLET | Refills: 0 | Status: SHIPPED | OUTPATIENT
Start: 2021-01-08 | End: 2021-03-05

## 2021-01-10 ENCOUNTER — HEALTH MAINTENANCE LETTER (OUTPATIENT)
Age: 66
End: 2021-01-10

## 2021-03-05 DIAGNOSIS — N95.1 SYMPTOMATIC MENOPAUSAL OR FEMALE CLIMACTERIC STATES: ICD-10-CM

## 2021-03-05 RX ORDER — ESTRADIOL 1 MG/1
TABLET ORAL
Qty: 30 TABLET | Refills: 0 | Status: SHIPPED | OUTPATIENT
Start: 2021-03-05 | End: 2021-03-24

## 2021-03-05 NOTE — TELEPHONE ENCOUNTER
Requested Prescriptions   Pending Prescriptions Disp Refills     estradiol (ESTRACE) 1 MG tablet 30 tablet 0     Sig: TAKE 1/2 TABLET BY MOUTH DAILY       There is no refill protocol information for this order        Last Written Prescription Date:  1/8/21  Last Fill Quantity: 30,  # refills: 0   Last office visit: 7/1/2019 with prescribing provider:  Dr Tom   Future Office Visit:   Next 5 appointments (look out 90 days)    Mar 24, 2021  1:30 PM  PHYSICAL with Ricky Tom MD  [facility] [address] [id number] [phone]

## 2021-03-05 NOTE — TELEPHONE ENCOUNTER
Medication is being filled for 1 time refill only due to:  Patient needs to be seen because it has been more than one year since last visit.  Appointment scheduled  Jolly Lopez RN on 3/5/2021 at 11:14 AM

## 2021-03-24 ENCOUNTER — OFFICE VISIT (OUTPATIENT)
Dept: OBGYN | Facility: CLINIC | Age: 66
End: 2021-03-24
Attending: OBSTETRICS & GYNECOLOGY
Payer: COMMERCIAL

## 2021-03-24 ENCOUNTER — ANCILLARY PROCEDURE (OUTPATIENT)
Dept: MAMMOGRAPHY | Facility: CLINIC | Age: 66
End: 2021-03-24
Attending: OBSTETRICS & GYNECOLOGY
Payer: COMMERCIAL

## 2021-03-24 VITALS
SYSTOLIC BLOOD PRESSURE: 128 MMHG | HEART RATE: 66 BPM | WEIGHT: 176.2 LBS | HEIGHT: 63 IN | BODY MASS INDEX: 31.22 KG/M2 | DIASTOLIC BLOOD PRESSURE: 72 MMHG

## 2021-03-24 DIAGNOSIS — Z12.31 VISIT FOR SCREENING MAMMOGRAM: ICD-10-CM

## 2021-03-24 DIAGNOSIS — Z01.419 ENCOUNTER FOR GYNECOLOGICAL EXAMINATION WITHOUT ABNORMAL FINDING: Primary | ICD-10-CM

## 2021-03-24 DIAGNOSIS — B00.9 HSV (HERPES SIMPLEX VIRUS) INFECTION: ICD-10-CM

## 2021-03-24 DIAGNOSIS — N95.1 SYMPTOMATIC MENOPAUSAL OR FEMALE CLIMACTERIC STATES: ICD-10-CM

## 2021-03-24 PROCEDURE — 99397 PER PM REEVAL EST PAT 65+ YR: CPT | Performed by: OBSTETRICS & GYNECOLOGY

## 2021-03-24 PROCEDURE — 87624 HPV HI-RISK TYP POOLED RSLT: CPT | Performed by: OBSTETRICS & GYNECOLOGY

## 2021-03-24 PROCEDURE — G0145 SCR C/V CYTO,THINLAYER,RESCR: HCPCS | Performed by: OBSTETRICS & GYNECOLOGY

## 2021-03-24 PROCEDURE — 77067 SCR MAMMO BI INCL CAD: CPT | Mod: TC | Performed by: RADIOLOGY

## 2021-03-24 RX ORDER — CHLORAL HYDRATE 500 MG
2 CAPSULE ORAL DAILY
COMMUNITY

## 2021-03-24 RX ORDER — TIOTROPIUM BROMIDE 18 UG/1
18 CAPSULE ORAL; RESPIRATORY (INHALATION)
COMMUNITY
Start: 2020-06-25

## 2021-03-24 RX ORDER — CALCIUM CARBONATE 500(1250)
1 TABLET ORAL 2 TIMES DAILY
COMMUNITY

## 2021-03-24 RX ORDER — ESTRADIOL 1 MG/1
TABLET ORAL
Qty: 45 TABLET | Refills: 3 | Status: SHIPPED | OUTPATIENT
Start: 2021-03-24 | End: 2022-04-12

## 2021-03-24 RX ORDER — VITAMIN B COMPLEX
TABLET ORAL DAILY
COMMUNITY

## 2021-03-24 RX ORDER — VIT C/B6/B5/MAGNESIUM/HERB 173 50-5-6-5MG
CAPSULE ORAL
COMMUNITY

## 2021-03-24 RX ORDER — ACYCLOVIR 400 MG/1
400 TABLET ORAL DAILY
Qty: 90 TABLET | Refills: 3 | Status: SHIPPED | OUTPATIENT
Start: 2021-03-24 | End: 2022-04-13

## 2021-03-24 RX ORDER — MULTIVITAMIN WITH IRON
1 TABLET ORAL DAILY
COMMUNITY

## 2021-03-24 ASSESSMENT — ANXIETY QUESTIONNAIRES
1. FEELING NERVOUS, ANXIOUS, OR ON EDGE: NOT AT ALL
6. BECOMING EASILY ANNOYED OR IRRITABLE: SEVERAL DAYS
7. FEELING AFRAID AS IF SOMETHING AWFUL MIGHT HAPPEN: SEVERAL DAYS
3. WORRYING TOO MUCH ABOUT DIFFERENT THINGS: SEVERAL DAYS
2. NOT BEING ABLE TO STOP OR CONTROL WORRYING: SEVERAL DAYS
IF YOU CHECKED OFF ANY PROBLEMS ON THIS QUESTIONNAIRE, HOW DIFFICULT HAVE THESE PROBLEMS MADE IT FOR YOU TO DO YOUR WORK, TAKE CARE OF THINGS AT HOME, OR GET ALONG WITH OTHER PEOPLE: SOMEWHAT DIFFICULT
5. BEING SO RESTLESS THAT IT IS HARD TO SIT STILL: NOT AT ALL
GAD7 TOTAL SCORE: 4

## 2021-03-24 ASSESSMENT — PATIENT HEALTH QUESTIONNAIRE - PHQ9
SUM OF ALL RESPONSES TO PHQ QUESTIONS 1-9: 4
5. POOR APPETITE OR OVEREATING: NOT AT ALL

## 2021-03-24 ASSESSMENT — MIFFLIN-ST. JEOR: SCORE: 1290.43

## 2021-03-24 NOTE — PROGRESS NOTES
Cheryle is a 66 year old No obstetric history on file. female who presents for Medicare Limited exam.     Do you have a Health Care Directive?: No, advance care planning information given to patient to review.  Patient plans to discuss their wishes with loved ones or provider.      Fall risk:   Fallen 2 or more times in the past year?: No  Any fall with injury in the past year?: No    HPI : The patient is seen at this time for her annual exam.  She has had a hysterectomy.  She is still requesting Pap surveillance as she has had that abnormals in the past prior to her hysterectomy.  She is on replacement therapy and is stable.  The risks and complications of ongoing replacement have been reviewed and accepted by the patient.    GYNECOLOGIC HISTORY:  No LMP recorded. Patient has had a hysterectomy..   reports that she has quit smoking. She has never used smokeless tobacco.    STD testing offered?  Declined  Last PHQ-9 score on record=   PHQ-9 SCORE 3/24/2021   PHQ-9 Total Score 4     Last GAD7 score on record=   AMANDA-7 SCORE 3/12/2018 7/1/2019 3/24/2021   Total Score 6 9 4       HEALTH MAINTENANCE:  Cholesterol: (No results found for: CHOL   Last Mammo: 07/01/2019, Result: Normal, Next Mammo: Today   Pap:   Lab Results   Component Value Date    PAP NIL, HPV- 03/12/2018    PAP NIL 01/03/2017    PAP NIL 06/30/2015      DEXA: 07/01/2019  Colonoscopy:  08/13/2012, Result:  Normal, Next Colonoscopy: 1 year.    HISTORY:  OB History   No obstetric history on file.     Past Medical History:   Diagnosis Date     HTN (hypertension)      Hypercholesterolemia      Incontinence      Past Surgical History:   Procedure Laterality Date     C LAT LUMBAR SPINE FUSION       HYSTERECTOMY  1999     HYSTERECTOMY, PAP NO LONGER INDICATED       Family History   Problem Relation Age of Onset     Hypertension Mother      Social History     Socioeconomic History     Marital status:      Spouse name: Not on file     Number of children: Not  on file     Years of education: Not on file     Highest education level: Not on file   Occupational History     Not on file   Social Needs     Financial resource strain: Not on file     Food insecurity     Worry: Not on file     Inability: Not on file     Transportation needs     Medical: Not on file     Non-medical: Not on file   Tobacco Use     Smoking status: Former Smoker     Smokeless tobacco: Never Used     Tobacco comment: stopped 4/11/16   Substance and Sexual Activity     Alcohol use: No     Drug use: No     Sexual activity: Not Currently     Partners: Male     Birth control/protection: Female Surgical     Comment: hysterectomy   Lifestyle     Physical activity     Days per week: Not on file     Minutes per session: Not on file     Stress: Not on file   Relationships     Social connections     Talks on phone: Not on file     Gets together: Not on file     Attends Bahai service: Not on file     Active member of club or organization: Not on file     Attends meetings of clubs or organizations: Not on file     Relationship status: Not on file     Intimate partner violence     Fear of current or ex partner: Not on file     Emotionally abused: Not on file     Physically abused: Not on file     Forced sexual activity: Not on file   Other Topics Concern     Parent/sibling w/ CABG, MI or angioplasty before 65F 55M? Not Asked   Social History Narrative     Not on file     Current Outpatient Medications   Medication Sig     acyclovir (ZOVIRAX) 400 MG tablet Take 1 tablet (400 mg) by mouth daily     albuterol (VENTOLIN HFA) 108 (90 BASE) MCG/ACT Inhaler Inhale 2 puffs into the lungs     atenolol (TENORMIN) 25 MG tablet Take 12.5 mg by mouth     calcium carbonate (OS-MATT) 500 MG tablet Take 1 tablet by mouth 2 times daily     CRANBERRY PO      estradiol (ESTRACE) 1 MG tablet TAKE 1/2 TABLET BY MOUTH DAILY     fish oil-omega-3 fatty acids 1000 MG capsule Take 2 g by mouth daily     fluticasone-salmeterol (ADVAIR  "DISKUS) 250-50 MCG/DOSE inhaler Inhale 1 puff into the lungs 2 times daily     magnesium 250 MG tablet Take 1 tablet by mouth daily     pravastatin (PRAVACHOL) 40 MG tablet      tiotropium (SPIRIVA) 18 MCG inhaled capsule Inhale 18 mcg into the lungs     triamterene-hydrochlorothiazide (MAXZIDE-25) 37.5-25 MG per tablet Take 0.5 tablets by mouth     Turmeric 500 MG CAPS      vitamin B-Complex Take 1 tablet by mouth daily     Vitamin D3 (CHOLECALCIFEROL) 25 mcg (1000 units) tablet Take by mouth daily     albuterol (2.5 MG/3ML) 0.083% neb solution Inhale 2.5 mg into the lungs     HYDROcodone-acetaminophen (NORCO)  MG per tablet Take 1 tablet by mouth every 6 hours as needed for moderate to severe pain     tiZANidine (ZANAFLEX) 4 MG tablet      No current facility-administered medications for this visit.      Allergies   Allergen Reactions     Acetaminophen Itching     Albuterol Cough     Aspirin Nausea     \"hot ears\"     Atorvastatin      Other reaction(s): Myalgia     Codeine Nausea and Vomiting     Ipratropium-Albuterol Cough     Morphine      Other reaction(s): Vomiting     Oxycodone-Acetaminophen Itching       Past medical, surgical, social and family history were reviewed and updated in EPIC.    EXAM:  /72   Pulse 66   Ht 1.588 m (5' 2.5\")   Wt 79.9 kg (176 lb 3.2 oz)   Breastfeeding No   BMI 31.71 kg/m     BMI: Body mass index is 31.71 kg/m .    Constitutional: Appearance: Well nourished, well developed alert, in no acute distress  Breasts: Inspection of Breasts:  No lymphadenopathy present    Palpation of Breasts and Axillae:  No masses present on palpation, no  breast tenderness    Axillary Lymph Nodes:  No lymphadenopathy present  Neurologic/Psychiatric:    Mental Status:  Oriented X3     Pelvic Exam:  External Genitalia:     Normal appearance for age, no discharge present, no tenderness present, no inflammatory lesions present, color normal  Vagina:     Normal vaginal vault without central " or paravaginal defects, no discharge present, no inflammatory lesions present, no masses present  Bladder:     Nontender to palpation  Urethra:   Urethral Body:  Urethra palpation normal, urethra structural support normal   Urethral Meatus:  No erythema or lesions present  Cervix:     Surgically absent  Uterus:     Surgically absent  Adnexa:     Surgically absent  Perineum:     Perineum within normal limits, no evidence of trauma, no rashes or skin lesions present  Anus:     Anus within normal limits, no hemorrhoids present  Inguinal Lymph Nodes:     No lymphadenopathy present    Body mass index is 31.71 kg/m .  Weight management plan noted, stable and monitoring   reports that she has quit smoking. She has never used smokeless tobacco.      ASSESSMENT:  66 year old female with satisfactory annual exam.    ICD-10-CM    1. Encounter for gynecological examination without abnormal finding  Z01.419 Pap imaged thin layer screen with HPV - recommended age 30 - 65     HPV High Risk Types DNA Cervical     Medicare Limited Visit       COUNSELING:   Reviewed preventive health counseling, as reflected in patient instructions       Regular exercise       Healthy diet/nutrition    PLAN/PATIENT INSTRUCTIONS:    We will convey the patient's screening test when available.  She will continue her estrogen at this time.    Ricky Tom MD

## 2021-03-25 ASSESSMENT — ANXIETY QUESTIONNAIRES: GAD7 TOTAL SCORE: 4

## 2021-03-26 LAB — COPATH REPORT: NORMAL

## 2021-10-23 ENCOUNTER — HEALTH MAINTENANCE LETTER (OUTPATIENT)
Age: 66
End: 2021-10-23

## 2022-02-12 ENCOUNTER — HEALTH MAINTENANCE LETTER (OUTPATIENT)
Age: 67
End: 2022-02-12

## 2022-04-12 DIAGNOSIS — N95.1 SYMPTOMATIC MENOPAUSAL OR FEMALE CLIMACTERIC STATES: ICD-10-CM

## 2022-04-12 RX ORDER — ESTRADIOL 1 MG/1
TABLET ORAL
Qty: 45 TABLET | Refills: 0 | Status: SHIPPED | OUTPATIENT
Start: 2022-04-12 | End: 2022-09-07

## 2022-04-12 NOTE — TELEPHONE ENCOUNTER
"Requested Prescriptions   Pending Prescriptions Disp Refills     estradiol (ESTRACE) 1 MG tablet [Pharmacy Med Name: ESTRADIOL 1 MG TABLET] 45 tablet 3     Sig: TAKE 1/2 TABLET BY MOUTH EVERY DAY       Hormone Replacement Therapy Failed - 4/12/2022 12:10 AM        Failed - Blood pressure under 140/90 in past 12 months     BP Readings from Last 3 Encounters:   03/24/21 128/72   07/01/19 146/80   03/12/18 114/68                 Failed - Recent (12 mo) or future (30 days) visit within the authorizing provider's specialty     Patient has had an office visit with the authorizing provider or a provider within the authorizing providers department within the previous 12 mos or has a future within next 30 days. See \"Patient Info\" tab in inbasket, or \"Choose Columns\" in Meds & Orders section of the refill encounter.              Passed - Patient has mammogram in past 2 years on file if age 50-75        Passed - Medication is active on med list        Passed - Patient is 18 years of age or older        Passed - No active pregnancy on record        Passed - No positive pregnancy test on record in past 12 months           Last Written Prescription Date:  03/24/2021  Last Fill Quantity: 45,  # refills: 3   Last office visit: 3/24/2021 with prescribing provider:  Dr. Tom  Future Office Visit:      Routing to provider to approve  Former Negro pt  Appointment needed for further refills  Ju Del Rio RN on 4/12/2022 at 8:39 AM    "

## 2022-07-09 DIAGNOSIS — B00.9 HSV (HERPES SIMPLEX VIRUS) INFECTION: ICD-10-CM

## 2022-07-09 DIAGNOSIS — N95.1 SYMPTOMATIC MENOPAUSAL OR FEMALE CLIMACTERIC STATES: ICD-10-CM

## 2022-07-11 RX ORDER — ESTRADIOL 1 MG/1
TABLET ORAL
Qty: 45 TABLET | Refills: 0 | OUTPATIENT
Start: 2022-07-11

## 2022-07-11 RX ORDER — ACYCLOVIR 400 MG/1
TABLET ORAL
Qty: 90 TABLET | Refills: 0 | OUTPATIENT
Start: 2022-07-11

## 2022-07-11 NOTE — TELEPHONE ENCOUNTER
"Requested Prescriptions   Pending Prescriptions Disp Refills     acyclovir (ZOVIRAX) 400 MG tablet [Pharmacy Med Name: ACYCLOVIR 400 MG TABLET] 90 tablet 0     Sig: TAKE 1 TABLET BY MOUTH EVERY DAY       Antivirals for Herpes Protocol Failed - 7/9/2022 12:16 AM        Failed - Recent (12 mo) or future (30 days) visit within the authorizing provider's specialty     Patient has had an office visit with the authorizing provider or a provider within the authorizing providers department within the previous 12 mos or has a future within next 30 days. See \"Patient Info\" tab in inbasket, or \"Choose Columns\" in Meds & Orders section of the refill encounter.              Failed - Normal serum creatinine on file in past 12 months     No lab results found.    Ok to refill medication if creatinine is low          Passed - Patient is age 12 or older        Passed - Medication is active on med list           estradiol (ESTRACE) 1 MG tablet [Pharmacy Med Name: ESTRADIOL 1 MG TABLET] 45 tablet 0     Sig: TAKE 1/2 TABLET BY MOUTH EVERY DAY       Hormone Replacement Therapy Failed - 7/9/2022 12:16 AM        Failed - Blood pressure under 140/90 in past 12 months     BP Readings from Last 3 Encounters:   03/24/21 128/72   07/01/19 146/80   03/12/18 114/68                 Failed - Recent (12 mo) or future (30 days) visit within the authorizing provider's specialty     Patient has had an office visit with the authorizing provider or a provider within the authorizing providers department within the previous 12 mos or has a future within next 30 days. See \"Patient Info\" tab in inbasket, or \"Choose Columns\" in Meds & Orders section of the refill encounter.              Passed - Patient has mammogram in past 2 years on file if age 50-75        Passed - Medication is active on med list        Passed - Patient is 18 years of age or older        Passed - No active pregnancy on record        Passed - No positive pregnancy test on record in past " 12 months           Refills denied  Appointment needed for further refills  Ju Del Rio RN on 7/11/2022 at 9:13 AM

## 2022-09-06 DIAGNOSIS — N95.1 SYMPTOMATIC MENOPAUSAL OR FEMALE CLIMACTERIC STATES: ICD-10-CM

## 2022-09-07 RX ORDER — ESTRADIOL 1 MG/1
TABLET ORAL
Qty: 45 TABLET | Refills: 0 | Status: SHIPPED | OUTPATIENT
Start: 2022-09-07 | End: 2022-09-15

## 2022-09-07 NOTE — TELEPHONE ENCOUNTER
"Requested Prescriptions   Pending Prescriptions Disp Refills     estradiol (ESTRACE) 1 MG tablet [Pharmacy Med Name: ESTRADIOL 1 MG TABLET] 45 tablet 0     Sig: TAKE 1/2 TABLET BY MOUTH EVERY DAY       Hormone Replacement Therapy Failed - 9/6/2022  1:51 PM        Failed - Blood pressure under 140/90 in past 12 months     BP Readings from Last 3 Encounters:   03/24/21 128/72   07/01/19 146/80   03/12/18 114/68                 Passed - Recent (12 mo) or future (30 days) visit within the authorizing provider's specialty     Patient has had an office visit with the authorizing provider or a provider within the authorizing providers department within the previous 12 mos or has a future within next 30 days. See \"Patient Info\" tab in inbasket, or \"Choose Columns\" in Meds & Orders section of the refill encounter.              Passed - Patient has mammogram in past 2 years on file if age 50-75        Passed - Medication is active on med list        Passed - Patient is 18 years of age or older        Passed - No active pregnancy on record        Passed - No positive pregnancy test on record in past 12 months           Next 5 appointments (look out 90 days)    Sep 15, 2022  1:50 PM  PHYSICAL with GETACHEW Saavedra CNP  [facility] ([facility] ) [address] [id number] [phone]        Former Negro pt  Ju Del Rio RN on 9/7/2022 at 9:38 AM    "

## 2022-09-09 NOTE — PROGRESS NOTES
Cheryle is a 67 year old No obstetric history on file. female who presents for Medicare Limited exam.     Do you have a Health Care Directive?: Yes, patient states has an Advance Care Planning document and will bring a copy to the clinic.    Fall risk:   Fallen 2 or more times in the past year?: No  Any fall with injury in the past year?: No    HPI :  Patient here today for her annual GYN exam and mammogram.  She needs no further Pap screening.  She is status post KULDEEP/BSO.  She is on no hormone replacement therapy at the level of the vagina but is on oral therapy.  She still has hot flashes.  She has no vaginal bleeding.  She went on a trip with her daughter in July and has since had external vaginal burning.  Urinalysis is pending at this time.  She denies any vaginal odor or discharge.    GYNECOLOGIC HISTORY:  No LMP recorded. Patient has had a hysterectomy..   reports that she has been smoking. She has never used smokeless tobacco.  Tobacco Cessation Action Plan: Self help information given to patient  STD testing offered?  Declined  Last PHQ-9 score on record=   PHQ-9 SCORE 9/15/2022   PHQ-9 Total Score 4     Last GAD7 score on record=   AMANDA-7 SCORE 7/1/2019 3/24/2021 9/15/2022   Total Score 9 4 4       HEALTH MAINTENANCE:  Cholesterol: (No results found for: CHOL   Last Mammo: One year ago, Result: Normal, Next Mammo: Today   Pap: (  Lab Results   Component Value Date    PAP NIL HPV- 03/12/2018    PAP NIL 01/03/2017    PAP NIL 06/30/2015    )  DEXA:  7/1/2019  Colonoscopy:  8/13/2012, Result:  Normal, Next Colonoscopy: 10 years.    HISTORY:  OB History   No obstetric history on file.     Past Medical History:   Diagnosis Date     HTN (hypertension)      Hypercholesterolemia      Incontinence      Past Surgical History:   Procedure Laterality Date     HYSTERECTOMY  1999     HYSTERECTOMY, PAP NO LONGER INDICATED       ZZC LAT LUMBAR SPINE FUSION       Family History   Problem Relation Age of Onset      "Hypertension Mother      Social History     Socioeconomic History     Marital status:    Tobacco Use     Smoking status: Former Smoker     Smokeless tobacco: Never Used     Tobacco comment: stopped 4/11/16   Substance and Sexual Activity     Alcohol use: No     Drug use: No     Sexual activity: Not Currently     Partners: Male     Birth control/protection: Female Surgical     Comment: hysterectomy     Current Outpatient Medications   Medication Sig     acyclovir (ZOVIRAX) 400 MG tablet Take 1 tablet (400 mg) by mouth daily     estradiol (ESTRACE) 1 MG tablet Take 0.5 tablets (0.5 mg) by mouth daily     albuterol (2.5 MG/3ML) 0.083% neb solution Inhale 2.5 mg into the lungs     albuterol (VENTOLIN HFA) 108 (90 BASE) MCG/ACT Inhaler Inhale 2 puffs into the lungs     atenolol (TENORMIN) 25 MG tablet Take 12.5 mg by mouth     calcium carbonate (OS-MATT) 500 MG tablet Take 1 tablet by mouth 2 times daily     CRANBERRY PO      fish oil-omega-3 fatty acids 1000 MG capsule Take 2 g by mouth daily     fluticasone-salmeterol (ADVAIR DISKUS) 250-50 MCG/DOSE inhaler Inhale 1 puff into the lungs 2 times daily     HYDROcodone-acetaminophen (NORCO)  MG per tablet Take 1 tablet by mouth every 6 hours as needed for moderate to severe pain     magnesium 250 MG tablet Take 1 tablet by mouth daily     pravastatin (PRAVACHOL) 40 MG tablet      tiotropium (SPIRIVA) 18 MCG inhaled capsule Inhale 18 mcg into the lungs     tiZANidine (ZANAFLEX) 4 MG tablet      triamterene-hydrochlorothiazide (MAXZIDE-25) 37.5-25 MG per tablet Take 0.5 tablets by mouth     Turmeric 500 MG CAPS      vitamin B-Complex Take 1 tablet by mouth daily     Vitamin D3 (CHOLECALCIFEROL) 25 mcg (1000 units) tablet Take by mouth daily     No current facility-administered medications for this visit.     Allergies   Allergen Reactions     Acetaminophen Itching     Albuterol Cough     Aspirin Nausea     \"hot ears\"     Atorvastatin      Other reaction(s): " "Myalgia     Codeine Nausea and Vomiting     Ipratropium-Albuterol Cough     Morphine      Other reaction(s): Vomiting     Oxycodone-Acetaminophen Itching       Past medical, surgical, social and family history were reviewed and updated in EPIC.    EXAM:  /82   Pulse 82   Ht 1.6 m (5' 3\")   Wt 70.8 kg (156 lb)   BMI 27.63 kg/m     BMI: Body mass index is 27.63 kg/m .    Constitutional: Appearance: Well nourished, well developed alert, in no acute distress  Breasts: Inspection of Breasts:  No lymphadenopathy present    Palpation of Breasts and Axillae:  No masses present on palpation, no  breast tenderness    Axillary Lymph Nodes:  No lymphadenopathy present  Neurologic/Psychiatric:    Mental Status:  Oriented X3     Pelvic Exam:  External Genitalia:     Normal appearance for age, no discharge present, no tenderness present, no inflammatory lesions present, color normal.  Erythematous.  Small linear lesion on the posterior fourchette.  Vagina:     Normal vaginal vault without central or paravaginal defects, no discharge present, no inflammatory lesions present, no masses present  Bladder:     Nontender to palpation  Urethra:   Urethral Body:  Urethra palpation normal, urethra structural support normal   Urethral Meatus:  No erythema or lesions present  Cervix:     Surgically absent  Uterus:     Surgically absent  Adnexa:     Surgically absent  Perineum:     Perineum within normal limits, no evidence of trauma, no rashes or skin lesions present  Anus:     Anus within normal limits, no hemorrhoids present  Inguinal Lymph Nodes:     No lymphadenopathy present    Body mass index is 27.63 kg/m .     reports that she has been smoking. She has never used smokeless tobacco.  Tobacco Cessation Action Plan: Information offered: Patient not interested at this time    ASSESSMENT:  67 year old female with satisfactory annual exam.    ICD-10-CM    1. Encounter for gynecological examination without abnormal finding  " Z01.419    2. Dysuria  R30.0 UA without Microscopic - lab collect     Urine Culture - Aerobic Bacterial     Urine Culture - Aerobic Bacterial   3. Symptomatic menopausal or female climacteric states  N95.1 estradiol (ESTRACE) 1 MG tablet   4. Itching of vagina  N89.8 Wet  Prep     Bacterial Vaginosis Smear     Yeast culture   5. HSV (herpes simplex virus) infection  B00.9 acyclovir (ZOVIRAX) 400 MG tablet   6. Special screening for osteoporosis  Z13.820 DX Hip/Pelvis/Spine     DX Wrist Heel Radius   7. Other specified disorders of bone density and structure, multiple sites   M85.89 DX Hip/Pelvis/Spine     DX Wrist Heel Radius       COUNSELING:   Special attention given to:       Regular exercise       Healthy diet/nutrition       Osteoporosis prevention/bone health    PLAN/PATIENT INSTRUCTIONS:    67-year-old female with vaginal irritation on exam.  Vaginal cultures will be sent.  Urinalysis is negative but we will send a culture.  We have asked her to use topical ointments for comfort until we get her cultures back.  We also discussed the possibility of introducing vaginal estrogen.  She is to continue with annual mammograms.  She is due for DEXA and we have asked her to do that this year or next year with her annual.  She needs no further Pap screening.  GETACHEW Saavedra CNP

## 2022-09-15 ENCOUNTER — OFFICE VISIT (OUTPATIENT)
Dept: OBGYN | Facility: CLINIC | Age: 67
End: 2022-09-15
Payer: COMMERCIAL

## 2022-09-15 VITALS
SYSTOLIC BLOOD PRESSURE: 128 MMHG | DIASTOLIC BLOOD PRESSURE: 82 MMHG | BODY MASS INDEX: 27.64 KG/M2 | WEIGHT: 156 LBS | HEIGHT: 63 IN | HEART RATE: 82 BPM

## 2022-09-15 DIAGNOSIS — M85.89 OTHER SPECIFIED DISORDERS OF BONE DENSITY AND STRUCTURE, MULTIPLE SITES: ICD-10-CM

## 2022-09-15 DIAGNOSIS — N95.1 SYMPTOMATIC MENOPAUSAL OR FEMALE CLIMACTERIC STATES: ICD-10-CM

## 2022-09-15 DIAGNOSIS — Z13.820 SPECIAL SCREENING FOR OSTEOPOROSIS: ICD-10-CM

## 2022-09-15 DIAGNOSIS — R30.0 DYSURIA: ICD-10-CM

## 2022-09-15 DIAGNOSIS — N89.8 ITCHING OF VAGINA: ICD-10-CM

## 2022-09-15 DIAGNOSIS — B00.9 HSV (HERPES SIMPLEX VIRUS) INFECTION: ICD-10-CM

## 2022-09-15 DIAGNOSIS — Z01.419 ENCOUNTER FOR GYNECOLOGICAL EXAMINATION WITHOUT ABNORMAL FINDING: Primary | ICD-10-CM

## 2022-09-15 LAB
ALBUMIN UR-MCNC: NEGATIVE MG/DL
APPEARANCE UR: CLEAR
BILIRUB UR QL STRIP: NEGATIVE
CLUE CELLS: ABNORMAL
COLOR UR AUTO: YELLOW
GLUCOSE UR STRIP-MCNC: NEGATIVE MG/DL
HGB UR QL STRIP: NEGATIVE
KETONES UR STRIP-MCNC: NEGATIVE MG/DL
LEUKOCYTE ESTERASE UR QL STRIP: NEGATIVE
NITRATE UR QL: NEGATIVE
PH UR STRIP: 6 [PH] (ref 5–7)
SP GR UR STRIP: 1.02 (ref 1–1.03)
TRICHOMONAS, WET PREP: ABNORMAL
UROBILINOGEN UR STRIP-ACNC: 0.2 E.U./DL
WBC'S/HIGH POWER FIELD, WET PREP: ABNORMAL
YEAST, WET PREP: ABNORMAL

## 2022-09-15 PROCEDURE — 87102 FUNGUS ISOLATION CULTURE: CPT | Performed by: NURSE PRACTITIONER

## 2022-09-15 PROCEDURE — 87205 SMEAR GRAM STAIN: CPT | Performed by: NURSE PRACTITIONER

## 2022-09-15 PROCEDURE — 87086 URINE CULTURE/COLONY COUNT: CPT | Performed by: NURSE PRACTITIONER

## 2022-09-15 PROCEDURE — 87210 SMEAR WET MOUNT SALINE/INK: CPT | Performed by: NURSE PRACTITIONER

## 2022-09-15 PROCEDURE — 99397 PER PM REEVAL EST PAT 65+ YR: CPT | Performed by: NURSE PRACTITIONER

## 2022-09-15 PROCEDURE — 81003 URINALYSIS AUTO W/O SCOPE: CPT | Performed by: NURSE PRACTITIONER

## 2022-09-15 PROCEDURE — 99213 OFFICE O/P EST LOW 20 MIN: CPT | Mod: 25 | Performed by: NURSE PRACTITIONER

## 2022-09-15 RX ORDER — ACYCLOVIR 400 MG/1
400 TABLET ORAL DAILY
Qty: 90 TABLET | Refills: 3 | Status: SHIPPED | OUTPATIENT
Start: 2022-09-15 | End: 2023-10-13

## 2022-09-15 RX ORDER — ESTRADIOL 1 MG/1
0.5 TABLET ORAL DAILY
Qty: 45 TABLET | Refills: 3 | Status: SHIPPED | OUTPATIENT
Start: 2022-09-15 | End: 2023-10-13

## 2022-09-15 ASSESSMENT — PATIENT HEALTH QUESTIONNAIRE - PHQ9
SUM OF ALL RESPONSES TO PHQ QUESTIONS 1-9: 4
5. POOR APPETITE OR OVEREATING: NOT AT ALL

## 2022-09-15 ASSESSMENT — ANXIETY QUESTIONNAIRES
5. BEING SO RESTLESS THAT IT IS HARD TO SIT STILL: NOT AT ALL
GAD7 TOTAL SCORE: 4
IF YOU CHECKED OFF ANY PROBLEMS ON THIS QUESTIONNAIRE, HOW DIFFICULT HAVE THESE PROBLEMS MADE IT FOR YOU TO DO YOUR WORK, TAKE CARE OF THINGS AT HOME, OR GET ALONG WITH OTHER PEOPLE: SOMEWHAT DIFFICULT
2. NOT BEING ABLE TO STOP OR CONTROL WORRYING: SEVERAL DAYS
1. FEELING NERVOUS, ANXIOUS, OR ON EDGE: NOT AT ALL
6. BECOMING EASILY ANNOYED OR IRRITABLE: SEVERAL DAYS
7. FEELING AFRAID AS IF SOMETHING AWFUL MIGHT HAPPEN: SEVERAL DAYS
3. WORRYING TOO MUCH ABOUT DIFFERENT THINGS: SEVERAL DAYS
GAD7 TOTAL SCORE: 4

## 2022-09-16 LAB
BACTERIAL VAGINOSIS SMEAR: ABNORMAL
BACTERIAL VAGINOSIS SMEAR: ABNORMAL
NUGENT SCORE: 3
WHITE BLOOD CELLS: NORMAL

## 2022-09-16 RX ORDER — FLUCONAZOLE 150 MG/1
150 TABLET ORAL ONCE
Qty: 1 TABLET | Refills: 0 | Status: SHIPPED | OUTPATIENT
Start: 2022-09-16 | End: 2022-09-16

## 2022-09-17 LAB — BACTERIA UR CULT: NORMAL

## 2022-09-19 LAB — BACTERIA SPEC CULT: NO GROWTH

## 2022-09-22 ENCOUNTER — HOSPITAL ENCOUNTER (OUTPATIENT)
Dept: MAMMOGRAPHY | Facility: CLINIC | Age: 67
Discharge: HOME | End: 2022-09-22
Attending: NURSE PRACTITIONER
Payer: COMMERCIAL

## 2022-09-22 DIAGNOSIS — R92.8 ABNORMAL MAMMOGRAM: ICD-10-CM

## 2022-09-22 PROCEDURE — 77061 BREAST TOMOSYNTHESIS UNI: CPT | Mod: LT

## 2022-10-09 ENCOUNTER — HEALTH MAINTENANCE LETTER (OUTPATIENT)
Age: 67
End: 2022-10-09

## 2022-12-19 DIAGNOSIS — N89.8 ITCHING OF VAGINA: ICD-10-CM

## 2022-12-19 RX ORDER — FLUCONAZOLE 150 MG/1
150 TABLET ORAL ONCE
Qty: 1 TABLET | Refills: 0 | OUTPATIENT
Start: 2022-12-19 | End: 2022-12-19

## 2023-01-26 ENCOUNTER — TELEPHONE (OUTPATIENT)
Dept: OBGYN | Facility: CLINIC | Age: 68
End: 2023-01-26
Payer: COMMERCIAL

## 2023-01-26 NOTE — TELEPHONE ENCOUNTER
Panel Management Review    Date of last visit with a [facility] provider: Josesito on 9/15/22.  Date of next visit with a [facility] provider:  Josesito on 09/19/23.    Health Maintenance List    Health Maintenance   Topic Date Due     Pneumococcal Vaccine: 65+ Years (1 - PCV) Never done     LIPID  Never done     ZOSTER IMMUNIZATION (1 of 2) Never done     DTAP/TDAP/TD IMMUNIZATION (2 - Td or Tdap) 05/14/2020     COVID-19 Vaccine (4 - Booster for Pfizer series) 03/29/2022     COLORECTAL CANCER SCREENING  08/13/2022     INFLUENZA VACCINE (1) Never done     PHQ-2 (once per calendar year)  01/01/2023     NICOTINE/TOBACCO CESSATION COUNSELING Q 1 YR  09/15/2023     MEDICARE ANNUAL WELLNESS VISIT  09/15/2023     FALL RISK ASSESSMENT  09/15/2023     MAMMO SCREENING  09/22/2024     ADVANCE CARE PLANNING  09/15/2027     DEXA  07/01/2034     HEPATITIS C SCREENING  Completed     IPV IMMUNIZATION  Aged Out     MENINGITIS IMMUNIZATION  Aged Out       Composite cancer screening  Chart review shows that this patient is due/due soon for the following Colonoscopy  Lab Results   Component Value Date    PAP NIL 03/12/2018     Past Surgical History:   Procedure Laterality Date     HYSTERECTOMY  1999     HYSTERECTOMY, PAP NO LONGER INDICATED       ZZC LAT LUMBAR SPINE FUSION         Summary:    Patient is due/failing the following:   Colonoscopy    Action needed: Patient needs referral/order: colonoscopy    Type of outreach:  Sent letter.      Staff Signature:  Jackie Ryan MA on 1/26/2023 at 9:18 AM

## 2023-10-12 DIAGNOSIS — N95.1 SYMPTOMATIC MENOPAUSAL OR FEMALE CLIMACTERIC STATES: ICD-10-CM

## 2023-10-13 DIAGNOSIS — B00.9 HSV (HERPES SIMPLEX VIRUS) INFECTION: ICD-10-CM

## 2023-10-13 RX ORDER — ESTRADIOL 1 MG/1
0.5 TABLET ORAL DAILY
Qty: 45 TABLET | Refills: 0 | Status: SHIPPED | OUTPATIENT
Start: 2023-10-13 | End: 2024-01-09

## 2023-10-13 RX ORDER — ACYCLOVIR 400 MG/1
400 TABLET ORAL DAILY
Qty: 90 TABLET | Refills: 0 | Status: SHIPPED | OUTPATIENT
Start: 2023-10-13 | End: 2024-01-10

## 2023-10-13 NOTE — TELEPHONE ENCOUNTER
"Requested Prescriptions   Pending Prescriptions Disp Refills    acyclovir (ZOVIRAX) 400 MG tablet 90 tablet 3     Sig: Take 1 tablet (400 mg) by mouth daily       Antivirals for Herpes Protocol Failed - 10/13/2023  3:25 PM        Failed - Recent (12 mo) or future (30 days) visit within the authorizing provider's specialty     Patient has had an office visit with the authorizing provider or a provider within the authorizing providers department within the previous 12 mos or has a future within next 30 days. See \"Patient Info\" tab in inbasket, or \"Choose Columns\" in Meds & Orders section of the refill encounter.              Failed - Normal serum creatinine on file in past 12 months     No lab results found.    Ok to refill medication if creatinine is low          Passed - Patient is age 12 or older        Passed - Medication is active on med list           Last Written Prescription Date:  09/15/2022  Last Fill Quantity: 90,  # refills: 3   Last office visit: 9/15/2022 ; last virtual visit: Visit date not found with prescribing provider:  Tmara Bellamy   Future Office Visit    Appt scheduled  Refill approved  Ju Del Rio RN on 10/13/2023 at 3:33 PM       "

## 2023-10-13 NOTE — TELEPHONE ENCOUNTER
"Requested Prescriptions   Pending Prescriptions Disp Refills    estradiol (ESTRACE) 1 MG tablet [Pharmacy Med Name: ESTRADIOL 1 MG TABLET] 45 tablet 3     Sig: TAKE 0.5 TABLETS (0.5 MG) BY MOUTH DAILY       Hormone Replacement Therapy Failed - 10/12/2023  7:36 PM        Failed - Blood pressure under 140/90 in past 12 months     BP Readings from Last 3 Encounters:   09/15/22 128/82   03/24/21 128/72   07/01/19 146/80                 Failed - Recent (12 mo) or future (30 days) visit within the authorizing provider's specialty     Patient has had an office visit with the authorizing provider or a provider within the authorizing providers department within the previous 12 mos or has a future within next 30 days. See \"Patient Info\" tab in inbasket, or \"Choose Columns\" in Meds & Orders section of the refill encounter.              Passed - Patient has mammogram in past 2 years on file if age 50-75        Passed - Medication is active on med list        Passed - Patient is 18 years of age or older        Passed - No active pregnancy on record        Passed - No positive pregnancy test on record in past 12 months           One month refill provided  Annual exam needed for further refills  Ju Del Rio RN on 10/13/2023 at 3:06 PM    "

## 2023-10-28 ENCOUNTER — HEALTH MAINTENANCE LETTER (OUTPATIENT)
Age: 68
End: 2023-10-28

## 2023-11-27 PROBLEM — F42.9 OBSESSIVE COMPULSIVE DISORDER: Status: ACTIVE | Noted: 2017-01-03

## 2023-11-27 PROBLEM — B07.0 PLANTAR WART OF LEFT FOOT: Status: ACTIVE | Noted: 2019-09-17

## 2023-11-27 PROBLEM — M25.512 ACUTE PAIN OF LEFT SHOULDER: Status: ACTIVE | Noted: 2017-05-10

## 2023-11-27 PROBLEM — G89.4 CHRONIC PAIN DISORDER: Status: ACTIVE | Noted: 2017-11-08

## 2023-11-27 PROBLEM — B37.0 THRUSH: Status: ACTIVE | Noted: 2017-05-10

## 2023-11-27 PROBLEM — M25.511 PAIN IN JOINT OF RIGHT SHOULDER: Status: ACTIVE | Noted: 2017-05-10

## 2023-11-27 PROBLEM — R53.83 FATIGUE: Status: ACTIVE | Noted: 2017-11-08

## 2023-11-27 PROBLEM — G47.00 INSOMNIA: Status: ACTIVE | Noted: 2022-07-27

## 2024-01-09 DIAGNOSIS — N95.1 SYMPTOMATIC MENOPAUSAL OR FEMALE CLIMACTERIC STATES: ICD-10-CM

## 2024-01-09 RX ORDER — ESTRADIOL 1 MG/1
0.5 TABLET ORAL DAILY
Qty: 45 TABLET | Refills: 0 | Status: SHIPPED | OUTPATIENT
Start: 2024-01-09 | End: 2024-01-30

## 2024-01-09 NOTE — TELEPHONE ENCOUNTER
Requested Prescriptions   Pending Prescriptions Disp Refills    estradiol (ESTRACE) 1 MG tablet [Pharmacy Med Name: ESTRADIOL 1 MG TABLET] 45 tablet 0     Sig: TAKE 1/2 TABLET BY MOUTH DAILY       Hormone Replacement Therapy Failed - 1/9/2024 12:15 AM        Failed - Blood pressure under 140/90 in past 12 months     BP Readings from Last 3 Encounters:   09/15/22 128/82   03/24/21 128/72   07/01/19 146/80                 Failed - Recent (12 mo) or future (30 days) visit within the authorizing provider's specialty     The patient must have completed an in-person or virtual visit within the past 12 months or has a future visit scheduled within the next 90 days with the authorizing provider s specialty.  Urgent care and e-visits do not quality as an office visit for this protocol.          Passed - Patient has mammogram in past 2 years on file if age 50-75        Passed - Medication is active on med list        Passed - Patient is 18 years of age or older        Passed - No active pregnancy on record        Passed - No positive pregnancy test on record in past 12 months           Appt scheduled 1/30/24  Refill approved  Ju Del Rio RN on 1/9/2024 at 10:32 AM

## 2024-01-10 DIAGNOSIS — B00.9 HSV (HERPES SIMPLEX VIRUS) INFECTION: ICD-10-CM

## 2024-01-10 RX ORDER — ACYCLOVIR 400 MG/1
400 TABLET ORAL DAILY
Qty: 30 TABLET | Refills: 0 | Status: SHIPPED | OUTPATIENT
Start: 2024-01-10 | End: 2024-01-30

## 2024-01-10 NOTE — TELEPHONE ENCOUNTER
Requested Prescriptions   Pending Prescriptions Disp Refills    acyclovir (ZOVIRAX) 400 MG tablet [Pharmacy Med Name: ACYCLOVIR 400 MG TABLET] 90 tablet 0     Sig: TAKE 1 TABLET BY MOUTH EVERY DAY       Antivirals for Herpes Protocol Failed - 1/10/2024 12:07 AM        Failed - Recent (12 mo) or future (30 days) visit within the authorizing provider's specialty     The patient must have completed an in-person or virtual visit within the past 12 months or has a future visit scheduled within the next 90 days with the authorizing provider s specialty.  Urgent care and e-visits do not quality as an office visit for this protocol.          Failed - Normal serum creatinine on file in past 12 months     No lab results found.    Ok to refill medication if creatinine is low          Passed - Patient is age 12 or older        Passed - Medication is active on med list           Last Written Prescription Date:  10/13/23  Last Fill Quantity: 90,  # refills: 0   Last office visit: 9/15/2022 ; last virtual visit: Visit date not found with prescribing provider:  Josesito   Future Office Visit:  1/30/24    Medication is being filled for 1 time refill only due to:  Patient needs to be seen because it has been more than one year since last visit.  Appointment scheduled  Jolly Lopez RN on 1/10/2024 at 6:37 AM

## 2024-01-23 RX ORDER — AZELASTINE HYDROCHLORIDE 137 UG/1
SPRAY, METERED NASAL
COMMUNITY
Start: 2023-10-16

## 2024-01-23 RX ORDER — AMLODIPINE BESYLATE 5 MG/1
TABLET ORAL
COMMUNITY
Start: 2023-02-17

## 2024-01-23 RX ORDER — LEVOFLOXACIN 750 MG/1
TABLET, FILM COATED ORAL
COMMUNITY
Start: 2023-10-13

## 2024-01-23 RX ORDER — ASCORBIC ACID 1000 MG
10 TABLET ORAL
COMMUNITY
Start: 2021-11-12

## 2024-01-23 RX ORDER — GABAPENTIN 300 MG/1
300 CAPSULE ORAL 2 TIMES DAILY
COMMUNITY

## 2024-01-23 RX ORDER — DEXAMETHASONE 4 MG/1
TABLET ORAL
COMMUNITY
Start: 2023-12-01

## 2024-01-23 RX ORDER — LEVOFLOXACIN 500 MG/1
TABLET, FILM COATED ORAL
COMMUNITY
Start: 2023-09-29

## 2024-01-23 RX ORDER — IPRATROPIUM BROMIDE AND ALBUTEROL SULFATE 2.5; .5 MG/3ML; MG/3ML
SOLUTION RESPIRATORY (INHALATION)
COMMUNITY
Start: 2023-12-28

## 2024-01-23 RX ORDER — LACTULOSE 10 G/15ML
SOLUTION ORAL
COMMUNITY
Start: 2023-02-16

## 2024-01-23 NOTE — PROGRESS NOTES
SUBJECTIVE:                                                   Cheryle C Warnberg is a 68 year old female who presents to clinic today for the following health issue(s):  Patient presents with:  Follow Up: Scan follow        HPI:  Patient here today with her daughter to discuss medication refills.  She needs a refill of her estradiol tablet and acyclovir.  She takes both once daily.    She has concerns about frequent bladder infections.  She was recently seen with a negative urinalysis but is being treated based off of symptoms.  She is on no vaginal estrogen.    She does wear pads daily due to incontinence.    S/p KULDEEP/BSO    No LMP recorded. Patient has had a hysterectomy..     Patient is not sexually active, No obstetric history on file..  Using oral contraceptives for contraception.    reports that she has been smoking. She has never used smokeless tobacco.    STD testing offered?  Declined    Health maintenance updated:  colon, flu, tdap, fall     Today's PHQ-2 Score:       1/30/2024     2:29 PM   PHQ-2 ( 1999 [employer])   Q1: Little interest or pleasure in doing things 2   Q2: Feeling down, depressed or hopeless 0   PHQ-2 Score 2     Today's PHQ-9 Score:       1/30/2024     2:29 PM   PHQ-9 SCORE   PHQ-9 Total Score 5     Today's AMANDA-7 Score:       1/30/2024     2:29 PM   AMANDA-7 SCORE   Total Score 4       Problem list and histories reviewed & adjusted, as indicated.  Additional history: as documented.    Patient Active Problem List   Diagnosis    Essential hypertension, benign    Malignant neoplasm of lower lobe of left lung (H)    Vitamin D deficiency    Thrush    Status post lobectomy of lung    Squamous cell carcinoma of left lung (H)    Sialolithiasis    Restless legs syndrome (RLS)    Pure hypercholesterolemia    Postprocedural pneumothorax    Plantar wart of left foot    Pain in joint of right shoulder    Obsessive compulsive disorder    Insomnia    Hypertonicity of bladder    History of tobacco abuse     Fatigue    Emphysema/COPD (H)    Constipation    Chronic pain disorder    Backache    Atypical chest pain    Atrial bigeminy    Acute pain of left shoulder     Past Surgical History:   Procedure Laterality Date    HYSTERECTOMY  1999    HYSTERECTOMY, PAP NO LONGER INDICATED      ZZC LAT LUMBAR SPINE FUSION        Social History     Tobacco Use    Smoking status: Every Day    Smokeless tobacco: Never    Tobacco comments:     stopped 4/11/16   Substance Use Topics    Alcohol use: No      Problem (# of Occurrences) Relation (Name,Age of Onset)    Hypertension (1) Mother              Current Outpatient Medications   Medication Sig    acyclovir (ZOVIRAX) 400 MG tablet Take 1 tablet (400 mg) by mouth daily    albuterol (2.5 MG/3ML) 0.083% neb solution Inhale 2.5 mg into the lungs    albuterol (VENTOLIN HFA) 108 (90 BASE) MCG/ACT Inhaler Inhale 2 puffs into the lungs    atenolol (TENORMIN) 25 MG tablet Take 12.5 mg by mouth    calcium carbonate (OS-MATT) 500 MG tablet Take 1 tablet by mouth 2 times daily    Coenzyme Q10 (CO Q 10) 10 MG CAPS Take 10 mg by mouth    CRANBERRY PO     estradiol (ESTRACE) 0.1 MG/GM vaginal cream Place 0.5 g vaginally at bedtime For 30 nights, then 3 times per week thereafter. Use finger to apply.    estradiol (ESTRACE) 1 MG tablet Take 0.5 tablets (0.5 mg) by mouth daily    fish oil-omega-3 fatty acids 1000 MG capsule Take 2 g by mouth daily    fluticasone-salmeterol (ADVAIR DISKUS) 250-50 MCG/DOSE inhaler Inhale 1 puff into the lungs 2 times daily    gabapentin (NEURONTIN) 300 MG capsule Take 300 mg by mouth 2 times daily    HYDROcodone-acetaminophen (NORCO)  MG per tablet Take 1 tablet by mouth every 6 hours as needed for moderate to severe pain    ipratropium - albuterol 0.5 mg/2.5 mg/3 mL (DUONEB) 0.5-2.5 (3) MG/3ML neb solution INHALE 3 ML VIA A NEBULIZER EVERY 6 HOURS IF NEEDED FOR SHORTNESS OF BREATH OR WHEEZING.    magnesium 250 MG tablet Take 1 tablet by mouth daily    montelukast  "(SINGULAIR) 10 MG tablet Take 1 tablet by mouth at bedtime    nitroFURantoin macrocrystal-monohydrate (MACROBID) 100 MG capsule Take 100 mg by mouth    omeprazole (PRILOSEC) 20 MG DR capsule TAKE 1 CAPSULE BY MOUTH EVERY DAY BEFORE MEALS    tiotropium (SPIRIVA) 18 MCG inhaled capsule Inhale 18 mcg into the lungs    tiZANidine (ZANAFLEX) 4 MG tablet     triamterene-hydrochlorothiazide (MAXZIDE-25) 37.5-25 MG per tablet Take 0.5 tablets by mouth    Turmeric 500 MG CAPS     Vitamin D3 (CHOLECALCIFEROL) 25 mcg (1000 units) tablet Take by mouth daily    WIXELA INHUB 500-50 MCG/ACT inhaler TAKE 1 PUFF BY MOUTH EVERY 12 HOURS    amLODIPine (NORVASC) 5 MG tablet TAKE A SINGLE TABLET TWO HOURS PRIOR TO SCHEDULE DENTAL APPT (Patient not taking: Reported on 1/30/2024)    Azelastine HCl 137 MCG/SPRAY SOLN INHALE 2 SPRAYS INTO AFFECTED NOSTRIL(S) TWO TIMES DAILY. (Patient not taking: Reported on 1/30/2024)    dexAMETHasone (DECADRON) 4 MG tablet TAKE 1 TABLET (4 MG) BY MOUTH ONCE DAILY WITH A MEAL. (Patient not taking: Reported on 1/30/2024)    lactulose (CHRONULAC) 10 GM/15ML solution TAKE 15ML BY MOUTH 2 TIMES DAILY AS NEEDED (Patient not taking: Reported on 1/30/2024)    levofloxacin (LEVAQUIN) 500 MG tablet TAKE 1 TABLET (500 MG) BY MOUTH ONCE DAILY FOR 5 DAYS. (Patient not taking: Reported on 1/30/2024)    levofloxacin (LEVAQUIN) 750 MG tablet TAKE 1 TABLET (750 MG) BY MOUTH ONCE DAILY FOR 10 DAYS. (Patient not taking: Reported on 1/30/2024)    pravastatin (PRAVACHOL) 40 MG tablet  (Patient not taking: Reported on 1/30/2024)    vitamin B-Complex Take 1 tablet by mouth daily (Patient not taking: Reported on 1/30/2024)     No current facility-administered medications for this visit.     Allergies   Allergen Reactions    Acetaminophen Itching    Albuterol Cough    Aspirin Nausea     \"hot ears\"    Atorvastatin      Other reaction(s): Myalgia    Cefadroxil Nausea and Vomiting and Other (See Comments)     Also states ears go " "irritated and felt hot.    Codeine Nausea and Vomiting    Doxycycline Nausea and Vomiting    Ipratropium-Albuterol Cough    Morphine      Other reaction(s): Vomiting    Oxycodone-Acetaminophen Itching       ROS:  12 point review of systems negative other than symptoms noted below or in the HPI.  No urinary frequency or dysuria, bladder or kidney problems      OBJECTIVE:     /80   Ht 1.575 m (5' 2\")   Wt 73.5 kg (162 lb)   BMI 29.63 kg/m    Body mass index is 29.63 kg/m .    Exam:  Constitutional:  Appearance: Well nourished, well developed alert, in no acute distress  Psychiatric:  Mentation appears normal and affect normal/bright.  Pelvic Exam:  External Genitalia:     Normal appearance for age, no discharge present, no tenderness present, no inflammatory lesions present, color normal small area of erythema on the left inner labia.  Vagina:     Normal vaginal vault without central or paravaginal defects, no discharge present, no inflammatory lesions present, no masses present  Bladder:     Nontender to palpation  Urethra:   Urethral Body:  Urethra palpation normal, urethra structural support normal   Urethral Meatus:  No erythema or lesions present  Cervix:     Surgically absent  Uterus:     Surgically absent  Adnexa:     Surgically absent  Perineum:     Perineum within normal limits, no evidence of trauma, no rashes or skin lesions present  Anus:     Anus within normal limits, no hemorrhoids present  Inguinal Lymph Nodes:     No lymphadenopathy present     In-Clinic Test Results:  Results for orders placed or performed in visit on 01/30/24 (from the past 24 hour(s))   DX Wrist Heel Radius    Narrative    EXAM: DX HIP/PELVIS/SPINE, DX WRIST/HEEL/RADIUS  LOCATION: [facility] [place]  DATE: 1/30/2024    INDICATION: Height loss, family history of osteoporosis in a first-degree relative, postmenopausal.  DEMOGRAPHICS: Age- 68 years. Gender- Female. Menopausal status- " Postmenopausal.  COMPARISON: 07/01/2019  TECHNIQUE: Dual-energy x-ray absorptiometry (DXA) performed with routine technique. Forearm DXA performed since the hip and/or spine could not be measured or interpreted.     FINDINGS:    DXA RESULTS  -RIGHT Hip Total: BMD: 0.930 g/cm2. T-score: -0.6. Z-score: 0.9.  -RIGHT Hip Femoral neck: BMD: 0.836 g/cm2. T-score: -1.5. Z-score: 0.3.  -LEFT Hip Total: BMD: 0.886 g/cm2. T-score: -1.0. Z-score: 0.5.  -LEFT Hip Femoral neck: BMD: 0.872 g/cm2. T-score: -1.2. Z-score: 0.5.  -LEFT FOREARM Radius 33%: BMD: 1.013 g/cm2. T-score: 1.6. Z-score: 3.4.    WHO T-SCORE CRITERIA  -Normal: T score at or above -1 SD  -Osteopenia: T score between -1 and -2.5 SD  -Osteoporosis: T score at or below -2.5 SD    The World Health Organization (WHO) criteria is applicable to perimenopausal females, postmenopausal females, and men aged 50 years or older.    INTERVAL CHANGE  -There has been a 9.3% decrease in bilateral hip BMD.  -There has been a 1.2% decrease in the left forearm radius 33% BMD.    FRACTURE RISK  -The FRAX risk calculator is not applicable due to medication that is used for treatment of osteopenia/osteoporosis or can otherwise affect bone mineral density.      Impression    IMPRESSION: Low bone density (OSTEOPENIA). T score meets the WHO criteria for low bone density (osteopenia) at one or more measured sites. The risk of osteoporotic fracture increases approximately two-fold for each standard deviation decrease in T-score.       ASSESSMENT/PLAN:                                                        ICD-10-CM    1. Vaginal atrophy  N95.2 estradiol (ESTRACE) 0.1 MG/GM vaginal cream      2. HSV (herpes simplex virus) infection  B00.9 acyclovir (ZOVIRAX) 400 MG tablet      3. Symptomatic menopausal or female climacteric states  N95.1 estradiol (ESTRACE) 1 MG tablet          There are no Patient Instructions on file for this visit.    68-year-old postmenopausal female with vaginal  burning on the entire canal.  We will send a prescription for vaginal estrogen.  Methods risk benefits were discussed.  We did offer to see her again in 3 months if she needs to.    GETACHEW Saavedra CNP  [facility] [place]

## 2024-01-30 ENCOUNTER — ANCILLARY PROCEDURE (OUTPATIENT)
Dept: BONE DENSITY | Facility: CLINIC | Age: 69
End: 2024-01-30
Attending: NURSE PRACTITIONER
Payer: COMMERCIAL

## 2024-01-30 ENCOUNTER — OFFICE VISIT (OUTPATIENT)
Dept: OBGYN | Facility: CLINIC | Age: 69
End: 2024-01-30
Attending: NURSE PRACTITIONER
Payer: COMMERCIAL

## 2024-01-30 ENCOUNTER — ANCILLARY PROCEDURE (OUTPATIENT)
Dept: MAMMOGRAPHY | Facility: CLINIC | Age: 69
End: 2024-01-30
Attending: NURSE PRACTITIONER
Payer: COMMERCIAL

## 2024-01-30 VITALS
DIASTOLIC BLOOD PRESSURE: 80 MMHG | BODY MASS INDEX: 29.81 KG/M2 | HEIGHT: 62 IN | WEIGHT: 162 LBS | SYSTOLIC BLOOD PRESSURE: 108 MMHG

## 2024-01-30 DIAGNOSIS — Z78.0 ASYMPTOMATIC POSTMENOPAUSAL STATE: ICD-10-CM

## 2024-01-30 DIAGNOSIS — N95.2 VAGINAL ATROPHY: Primary | ICD-10-CM

## 2024-01-30 DIAGNOSIS — B00.9 HSV (HERPES SIMPLEX VIRUS) INFECTION: ICD-10-CM

## 2024-01-30 DIAGNOSIS — N95.1 SYMPTOMATIC MENOPAUSAL OR FEMALE CLIMACTERIC STATES: ICD-10-CM

## 2024-01-30 DIAGNOSIS — Z12.31 VISIT FOR SCREENING MAMMOGRAM: ICD-10-CM

## 2024-01-30 PROCEDURE — 99214 OFFICE O/P EST MOD 30 MIN: CPT | Performed by: NURSE PRACTITIONER

## 2024-01-30 PROCEDURE — 77080 DXA BONE DENSITY AXIAL: CPT | Mod: TC | Performed by: PHYSICIAN ASSISTANT

## 2024-01-30 PROCEDURE — 77067 SCR MAMMO BI INCL CAD: CPT | Mod: TC | Performed by: RADIOLOGY

## 2024-01-30 PROCEDURE — 77081 DXA BONE DENSITY APPENDICULR: CPT | Mod: TC | Performed by: PHYSICIAN ASSISTANT

## 2024-01-30 RX ORDER — ESTRADIOL 0.1 MG/G
0.5 CREAM VAGINAL AT BEDTIME
Qty: 42.5 G | Refills: 3 | Status: SHIPPED | OUTPATIENT
Start: 2024-01-30

## 2024-01-30 RX ORDER — ACYCLOVIR 400 MG/1
400 TABLET ORAL DAILY
Qty: 90 TABLET | Refills: 3 | Status: SHIPPED | OUTPATIENT
Start: 2024-01-30

## 2024-01-30 RX ORDER — NITROFURANTOIN 25; 75 MG/1; MG/1
100 CAPSULE ORAL
COMMUNITY
Start: 2024-01-29 | End: 2024-02-05

## 2024-01-30 RX ORDER — ESTRADIOL 1 MG/1
0.5 TABLET ORAL DAILY
Qty: 45 TABLET | Refills: 3 | Status: SHIPPED | OUTPATIENT
Start: 2024-01-30

## 2024-01-30 ASSESSMENT — ANXIETY QUESTIONNAIRES
GAD7 TOTAL SCORE: 4
6. BECOMING EASILY ANNOYED OR IRRITABLE: MORE THAN HALF THE DAYS
1. FEELING NERVOUS, ANXIOUS, OR ON EDGE: MORE THAN HALF THE DAYS
5. BEING SO RESTLESS THAT IT IS HARD TO SIT STILL: NOT AT ALL
3. WORRYING TOO MUCH ABOUT DIFFERENT THINGS: NOT AT ALL
2. NOT BEING ABLE TO STOP OR CONTROL WORRYING: NOT AT ALL
GAD7 TOTAL SCORE: 4
IF YOU CHECKED OFF ANY PROBLEMS ON THIS QUESTIONNAIRE, HOW DIFFICULT HAVE THESE PROBLEMS MADE IT FOR YOU TO DO YOUR WORK, TAKE CARE OF THINGS AT HOME, OR GET ALONG WITH OTHER PEOPLE: SOMEWHAT DIFFICULT
7. FEELING AFRAID AS IF SOMETHING AWFUL MIGHT HAPPEN: NOT AT ALL

## 2024-01-30 ASSESSMENT — PATIENT HEALTH QUESTIONNAIRE - PHQ9
SUM OF ALL RESPONSES TO PHQ QUESTIONS 1-9: 5
5. POOR APPETITE OR OVEREATING: NOT AT ALL

## 2024-12-21 ENCOUNTER — HEALTH MAINTENANCE LETTER (OUTPATIENT)
Age: 69
End: 2024-12-21

## 2025-05-14 ENCOUNTER — TELEPHONE (OUTPATIENT)
Dept: OBGYN | Facility: CLINIC | Age: 70
End: 2025-05-14
Payer: COMMERCIAL

## 2025-05-14 DIAGNOSIS — N95.1 SYMPTOMATIC MENOPAUSAL OR FEMALE CLIMACTERIC STATES: ICD-10-CM

## 2025-05-14 RX ORDER — ESTRADIOL 1 MG/1
0.5 TABLET ORAL DAILY
Qty: 45 TABLET | Refills: 0 | Status: SHIPPED | OUTPATIENT
Start: 2025-05-14

## 2025-05-14 NOTE — TELEPHONE ENCOUNTER
estradiol (ESTRACE) 1 MG tablet     Last Written Prescription Date:  1/30/24  Last Fill Quantity: 45,  # refills: 3   Last office visit: 1/30/2024 ; last virtual visit: Visit date not found with prescribing provider:  Tamra Bellamy NP    Future Office Visit:  6/17/25 with Tamra Bellamy     Rx approved to get to upcoming appt with Tamra He RN on 5/14/2025 at 12:53 PM  [facility] Triage

## 2025-05-14 NOTE — TELEPHONE ENCOUNTER
[facility] Call Center    Phone Message    May a detailed message be left on voicemail: yes     Reason for Call: Medication Refill Request    Has the patient contacted the pharmacy for the refill? Yes   Name of medication being requested: estradiol (ESTRACE) 1 MG tablet   Provider who prescribed the medication: Tamra Bellamy  Pharmacy: [id number] - [facility] [address] [facility]  Date medication is needed: asap- pt is currently out of medication, writer scheduled next available on 6/17/25       Action Taken: Message routed to:  Other: obgyn    Travel Screening: Not Applicable     Date of Service:

## 2025-06-14 ENCOUNTER — HEALTH MAINTENANCE LETTER (OUTPATIENT)
Age: 70
End: 2025-06-14

## 2025-06-17 DIAGNOSIS — B00.9 HSV (HERPES SIMPLEX VIRUS) INFECTION: ICD-10-CM

## 2025-06-17 RX ORDER — ACYCLOVIR 400 MG/1
400 TABLET ORAL DAILY
Qty: 90 TABLET | Refills: 0 | Status: SHIPPED | OUTPATIENT
Start: 2025-06-17

## 2025-06-17 NOTE — TELEPHONE ENCOUNTER
Requested Prescriptions   Pending Prescriptions Disp Refills    acyclovir (ZOVIRAX) 400 MG tablet [Pharmacy Med Name: ACYCLOVIR 400 MG TABLET] 90 tablet 3     Sig: TAKE 1 TABLET BY MOUTH EVERY DAY       Antivirals Failed - 6/17/2025  3:19 PM        Failed - Recent (12 month) or future (90 days) visit with authorizing provider's specialty (provided they have been seen in the past 15 months)     The patient must have completed an in-person or virtual visit within the past 12 months or has a future visit scheduled within the next 90 days with the authorizing provider s specialty.  Urgent care and e-visits do not qualify as an office visit for this protocol.          Passed - Patient is age 12 or older        Passed - Medication is active on med list and the sig matches. RN to manually verify dose and sig if red X/fail.     If the protocol passes (green check), you do not need to verify med dose and sig.    A prescription matches if they are the same clinical intention.    For Example: once daily and every morning are the same.    The protocol can not identify upper and lower case letters as matching and will fail.     For Example: Take 1 tablet (50 mg) by mouth daily     TAKE 1 TABLET (50 MG) BY MOUTH DAILY    For all fails (red x), verify dose and sig.    If the refill does match what is on file, the RN can still proceed to approve the refill request.       If they do not match, route to the appropriate provider.             Passed - Medication indicated for associated diagnosis     Medication is associated with one or more of the following diagnoses:     Genital herpes simplex   Herpes simples   Herpes zoster, shingles   Varicella   Recurrent herpes simplex labialis   HIV infection   Varicella-zoster virus infection, prophylaxis             Last Written Prescription Date:  1/30/2024  Last Fill Quantity: 90,  # refills: 3   Last office visit: 1/30/2024 ; last virtual visit: Visit date not found with prescribing  provider:  Tamra Bellamy NP   Future Office Visit:  7/29/2025 w SM    Medication is being filled for 1 time refill only due to:  Patient needs to be seen because it has been more than one year since last visit.  Tamra Vasques RN on 6/17/2025 at 3:20 PM

## 2025-08-09 DIAGNOSIS — N95.1 SYMPTOMATIC MENOPAUSAL OR FEMALE CLIMACTERIC STATES: ICD-10-CM

## 2025-08-12 RX ORDER — ESTRADIOL 1 MG/1
0.5 TABLET ORAL DAILY
Qty: 45 TABLET | Refills: 0 | Status: SHIPPED | OUTPATIENT
Start: 2025-08-12